# Patient Record
Sex: MALE | Race: WHITE | Employment: OTHER | URBAN - METROPOLITAN AREA
[De-identification: names, ages, dates, MRNs, and addresses within clinical notes are randomized per-mention and may not be internally consistent; named-entity substitution may affect disease eponyms.]

---

## 2017-03-14 ENCOUNTER — TELEPHONE (OUTPATIENT)
Dept: UROLOGY | Age: 77
End: 2017-03-14

## 2022-10-19 ENCOUNTER — HOSPITAL ENCOUNTER (OUTPATIENT)
Age: 82
Setting detail: SPECIMEN
Discharge: HOME | End: 2022-10-19

## 2022-10-19 LAB
ANION GAP SERPL CALCULATED.3IONS-SCNC: 12 MMOL/L (ref 9–17)
BUN BLDV-MCNC: 20 MG/DL (ref 8–23)
CALCIUM SERPL-MCNC: 9.4 MG/DL (ref 8.6–10.4)
CHLORIDE BLD-SCNC: 104 MMOL/L (ref 98–107)
CO2: 24 MMOL/L (ref 20–31)
CREAT SERPL-MCNC: 1.28 MG/DL (ref 0.7–1.2)
GFR SERPL CREATININE-BSD FRML MDRD: 56 ML/MIN/1.73M2
GLUCOSE BLD-MCNC: 288 MG/DL (ref 70–99)
POTASSIUM SERPL-SCNC: 4.8 MMOL/L (ref 3.7–5.3)
SODIUM BLD-SCNC: 140 MMOL/L (ref 135–144)

## 2022-11-16 ENCOUNTER — HOSPITAL ENCOUNTER (OUTPATIENT)
Age: 82
Setting detail: SPECIMEN
Discharge: HOME | End: 2022-11-16

## 2022-11-16 LAB
ANION GAP SERPL CALCULATED.3IONS-SCNC: 13 MMOL/L (ref 9–17)
BUN BLDV-MCNC: 20 MG/DL (ref 8–23)
CALCIUM SERPL-MCNC: 9.4 MG/DL (ref 8.6–10.4)
CHLORIDE BLD-SCNC: 101 MMOL/L (ref 98–107)
CO2: 25 MMOL/L (ref 20–31)
CREAT SERPL-MCNC: 1.31 MG/DL (ref 0.7–1.2)
GFR SERPL CREATININE-BSD FRML MDRD: 55 ML/MIN/1.73M2
GLUCOSE BLD-MCNC: 309 MG/DL (ref 70–99)
POTASSIUM SERPL-SCNC: 4.9 MMOL/L (ref 3.7–5.3)
SODIUM BLD-SCNC: 139 MMOL/L (ref 135–144)

## 2023-03-13 ENCOUNTER — HOSPITAL ENCOUNTER (OUTPATIENT)
Dept: CARDIAC CATH/INVASIVE PROCEDURES | Age: 83
Discharge: HOME | End: 2023-03-13
Payer: COMMERCIAL

## 2023-03-13 VITALS
TEMPERATURE: 97.6 F | SYSTOLIC BLOOD PRESSURE: 131 MMHG | HEART RATE: 60 BPM | RESPIRATION RATE: 19 BRPM | DIASTOLIC BLOOD PRESSURE: 58 MMHG | OXYGEN SATURATION: 99 %

## 2023-03-13 LAB
EGFR, POC: 47 ML/MIN/1.73M2
GLUCOSE BLD-MCNC: 151 MG/DL (ref 74–100)
PLATELET # BLD AUTO: 163 K/UL (ref 138–453)
POC BUN: 27 MG/DL (ref 8–26)
POC CHLORIDE: 106 MMOL/L (ref 98–107)
POC CREATININE: 1.49 MG/DL (ref 0.51–1.19)
POC HEMATOCRIT: 42 % (ref 41–53)
POC HEMOGLOBIN: 14.3 G/DL (ref 13.5–17.5)
POC IONIZED CALCIUM: 1.21 MMOL/L (ref 1.15–1.33)
POC POTASSIUM: 4.1 MMOL/L (ref 3.5–4.5)
POC SODIUM: 144 MMOL/L (ref 138–146)

## 2023-03-13 PROCEDURE — 2580000003 HC RX 258

## 2023-03-13 PROCEDURE — 82947 ASSAY GLUCOSE BLOOD QUANT: CPT

## 2023-03-13 PROCEDURE — 2720000010 HC SURG SUPPLY STERILE

## 2023-03-13 PROCEDURE — 7100000011 HC PHASE II RECOVERY - ADDTL 15 MIN

## 2023-03-13 PROCEDURE — 6360000002 HC RX W HCPCS

## 2023-03-13 PROCEDURE — 82330 ASSAY OF CALCIUM: CPT

## 2023-03-13 PROCEDURE — 85049 AUTOMATED PLATELET COUNT: CPT

## 2023-03-13 PROCEDURE — 84132 ASSAY OF SERUM POTASSIUM: CPT

## 2023-03-13 PROCEDURE — 33228 REMV&REPLC PM GEN DUAL LEAD: CPT

## 2023-03-13 PROCEDURE — 93458 L HRT ARTERY/VENTRICLE ANGIO: CPT

## 2023-03-13 PROCEDURE — 85014 HEMATOCRIT: CPT

## 2023-03-13 PROCEDURE — C1785 PMKR, DUAL, RATE-RESP: HCPCS

## 2023-03-13 PROCEDURE — 99153 MOD SED SAME PHYS/QHP EA: CPT

## 2023-03-13 PROCEDURE — 2580000003 HC RX 258: Performed by: INTERNAL MEDICINE

## 2023-03-13 PROCEDURE — 2500000003 HC RX 250 WO HCPCS

## 2023-03-13 PROCEDURE — 2709999900 HC NON-CHARGEABLE SUPPLY

## 2023-03-13 PROCEDURE — 7100000010 HC PHASE II RECOVERY - FIRST 15 MIN

## 2023-03-13 PROCEDURE — 84520 ASSAY OF UREA NITROGEN: CPT

## 2023-03-13 PROCEDURE — 82435 ASSAY OF BLOOD CHLORIDE: CPT

## 2023-03-13 PROCEDURE — 82565 ASSAY OF CREATININE: CPT

## 2023-03-13 PROCEDURE — 99152 MOD SED SAME PHYS/QHP 5/>YRS: CPT

## 2023-03-13 PROCEDURE — 84295 ASSAY OF SERUM SODIUM: CPT

## 2023-03-13 RX ORDER — SODIUM CHLORIDE 0.9 % (FLUSH) 0.9 %
5-40 SYRINGE (ML) INJECTION PRN
Status: DISCONTINUED | OUTPATIENT
Start: 2023-03-13 | End: 2023-03-14 | Stop reason: HOSPADM

## 2023-03-13 RX ORDER — SODIUM CHLORIDE 9 MG/ML
INJECTION, SOLUTION INTRAVENOUS CONTINUOUS
Status: DISCONTINUED | OUTPATIENT
Start: 2023-03-13 | End: 2023-03-14 | Stop reason: HOSPADM

## 2023-03-13 RX ORDER — SODIUM CHLORIDE 0.9 % (FLUSH) 0.9 %
5-40 SYRINGE (ML) INJECTION EVERY 12 HOURS SCHEDULED
Status: DISCONTINUED | OUTPATIENT
Start: 2023-03-13 | End: 2023-03-14 | Stop reason: HOSPADM

## 2023-03-13 RX ORDER — SODIUM CHLORIDE 9 MG/ML
INJECTION, SOLUTION INTRAVENOUS PRN
Status: DISCONTINUED | OUTPATIENT
Start: 2023-03-13 | End: 2023-03-14 | Stop reason: HOSPADM

## 2023-03-13 RX ADMIN — SODIUM CHLORIDE: 9 INJECTION, SOLUTION INTRAVENOUS at 09:32

## 2023-03-13 NOTE — PROGRESS NOTES
Patient admitted, consent signed and questions answered. Patient ready for procedure. Call light to reach with side rails up 2 of 2. Lt. chest clipped and wiped down with writer and Edis Pal present. No family at bedside with patient. History and physical complete.

## 2023-03-13 NOTE — PROGRESS NOTES
All discharge instructions reviewed, questions answered, paper signed and given copy. Patient discharged per wheelchair with PPM booklet/card, d/c paperwork and belongings.

## 2023-03-13 NOTE — PROCEDURES
[facility]                Procedure Note  Dual Chamber PPM battery change out        Nico Melvin (5 y.o., male)  1940      3/13/2023      Procedure: Battery change out    Operators:  Primary: Rain Raya MD    Indication: Battery EOL    Pre Procedure Conscious Sedation Data:    ASA Class:    [] I [x] II [] III [] IV    Mallampati Class:  [] I [x] II [] III [] IV          / Device Data:             [x] Sedation monitoring  [] Left Subclavian Angiogram   [] RV lead   [] RA lead   [] LV lead   [x] Intra - OP Lead Testing  [] Coronary Sinus Angiogram   [x] Pocket   [x] Generators Implant      Procedure  After the usual preparation of the left neck and chest, the patient was draped in the usual sterile manner. Local anesthesia was infused below the left clavicle from the midline laterally. An incision was made inferior and parallel to the clavicle. The incision was carried down to the fascia. A pocket was formed inferior using blunt dissection. Generator: The implanted leads were attached to the pacemaker using the setscrews. The pocket was irrigated with antibiotic solution. The pulse generator and leads were coiled and placed in the pocket. Fluoroscopy was used to verify the final placement of the pacemaker and leads. The pocket was closed using multiple layers of suture and a dry sterile dressing was applied. There were no complications, patient tolerated the procedure well. The patient left the EP lab in stable condition. Impression / Device:  Successful Implantation of:  Battery change out  Estimated blood loss less than 25 ml    Plan:  Telemetry monitoring  Interrogate pacemaker before discharge  CXR if needed  Wound check at [facility] in 7days  Discharge if patient remains stable    Patient instructed to no showering or get the wound wet for 1 week, no driving for 1 week, no lifting more than 10 pounds for 4 weeks, no arm raising above the shoulder for 4 weeks.  No lovenox or heparin at any dose is to be given    Electronically signed by Zeenat Colorado MD on 3/13/2023 at 11:29 AM

## 2023-03-13 NOTE — H&P
[facility]   History & Physical               Today's Date: 3/13/2023  Patient Name: Delia Flores  Date of admission: 3/13/2023  8:09 AM  Patient's age: 81 y.o., 1940  Admission Dx: Chest pain at rest [R07.9]    Reason for Admission:  Battery change out    CHIEF COMPLAINT:    No chief complaint on file. History Obtained From:  patient, electronic medical record    HISTORY OF PRESENT ILLNESS:    81year old male with history of complete heart block s/p ppm presents for a battery change out. Patient was seen in clinic by Dr. Benji Jackman and underwent a device interrogation which showed him to have < 5 months of battery life. Patient presents today for battery change out. Reports no complaints this AM.    Past Medical History:   has a past medical history of Gout, Hypercholesteremia, Hyperkalemia, Type II or unspecified type diabetes mellitus without mention of complication, uncontrolled, and Unspecified essential hypertension. Past Surgical History:   has a past surgical history that includes Carotid endarterectomy; Vasectomy; Spine surgery (1990); Spine surgery (1999); Colonoscopy (2005); Cataract removal; and Pacemaker insertion (2011). Home Medications:    Prior to Admission medications    Medication Sig Start Date End Date Taking? Authorizing Provider   allopurinol (ZYLOPRIM) 300 MG tablet TAKE 1 TABLET DAILY 10/13/14   Chris Alamo MD   atorvastatin (LIPITOR) 10 MG tablet Take 1 tablet by mouth daily. 8/12/14   Chris Alamo MD   metFORMIN (GLUCOPHAGE) 500 MG tablet Take 1 tablet by mouth 3 times daily (with meals). 5/27/14   Chris Alamo MD   tamsulosin (FLOMAX) 0.4 MG capsule TAKE 1 CAPSULE DAILY 5/27/14   Chris Alamo MD   lisinopril (PRINIVIL;ZESTRIL) 20 MG tablet Take 1 tablet by mouth daily. 5/27/14   Chris Alamo MD   oxyCODONE-acetaminophen (PERCOCET) 5-325 MG per tablet Take 1 tablet by mouth every 4 hours as needed. Historical Provider, MD   aspirin 325 MG tablet Take 325 mg by mouth daily. Jade Teresa MD   traMADol (ULTRAM) 50 MG tablet Take 50 mg by mouth every 6 hours as needed. Jade Teresa MD        No current facility-administered medications for this encounter. Allergies:  Flexeril [cyclobenzaprine hcl]    Social History:   reports that he quit smoking about 30 years ago. His smoking use included cigarettes. He has a 80.00 pack-year smoking history. He has never used smokeless tobacco. He reports that he does not drink alcohol. Family History: family history includes Emphysema in his father; Heart Disease in his father. REVIEW OF SYSTEMS:      Constitutional: there has been no unanticipated weight loss. Eyes: No visual changes or diplopia. ENT: No Headaches  Cardiovascular:  Remaining as above  Respiratory: No cough  Gastrointestinal: No abdominal pain. No change in bowel or bladder habits. Genitourinary: No dysuria, trouble voiding, or hematuria. Neurological: No headache. PHYSICAL EXAM:      There were no vitals taken for this visit. No intake or output data in the 24 hours ending 03/13/23 0813        Constitutional and General Appearance:   alert, cooperative, no distress and appears stated age  [de-identified]:  PERRL, EOMI  Respiratory:  Normal excursion and expansion without use of accessory muscles  Resp Auscultation:  Good respiratory effort. No for increased work of breathing. On auscultation: clear to auscultation bilaterally  Cardiovascular:  Regular rate and rhythm  S1/S2  No murmurs  The apical impulse is not displaced  Abdomen:  Soft  Bowel sounds present  Non-tender to palpation  Extremities:  No cyanosis or clubbing  Lower extremity edema: No  Skin:  Warm and dry  Neurological:  Not performed    DATA:    Diagnostics:    Device Interrogation      Labs:     CBC: No results for input(s): WBC, HGB, HCT, PLT in the last 72 hours.   BMP: No results for input(s): NA, K, CO2, BUN, CREATININE, LABGLOM, GLUCOSE in the last 72 hours. Pro-BNP:  No results for input(s): PROBNP in the last 72 hours. BNP: No results for input(s): BNP in the last 72 hours. PT/INR: No results for input(s): PROTIME, INR in the last 72 hours. APTT:No results for input(s): APTT in the last 72 hours. CARDIAC ENZYMES:No results for input(s): CKTOTAL, CKMB, CKMBINDEX, TROPONINI in the last 72 hours. Invalid input(s):  TROPONINT  No results for input(s): TROPONINT in the last 72 hours. FASTING LIPID PANEL:  Lab Results   Component Value Date/Time    HDL 39 08/04/2014 08:40 AM    TRIG 391 08/04/2014 08:40 AM     LIVER PROFILE:No results for input(s): AST, ALT, LABALBU in the last 72 hours. Patient's Active Problem List  Active Problems:    * No active hospital problems. *  Resolved Problems:    * No resolved hospital problems. *        IMPRESSION:    Complete Heart Block s/p PPM (noted to have <5 months of battery life)  HTN  Heart catheterization 07/2011 showed LMCA normal, LAD 30% lesion, LCx normal and RCA 30% lesion. HLD  VERONICA    RECOMMENDATIONS:  Proceed with battery change out  Further recommendations to follow after procedure. Pre Procedure Conscious Sedation Data:  ASA Class:                  [] I [x] II [] III [] IV     Mallampati Class:       [] I [x] II [] III [] IV    The risks, indications, benefits, as well as alternatives to the procedure  were discussed with the patient and family. The risks include but is not  limited to bleeding, hematoma formation, pneumothorax, infection, cardiac  perforation, and rarely heart attack. The patient understood and wished to  proceed. Discussed with patient and Nurse. Tiara Aguila MD  Fellow, [id number] [facility]      Please note that part of this chart were generated using voice recognition  dictation software.   Although every effort was made to ensure the accuracy of this automated transcription, some errors in transcription may have occurred. Attestation signed by      Attending Physician Statement:    I have discussed the care of  Symone Roach , including pertinent history and exam findings, with the Cardiology fellow/resident. I have seen and examined the patient and the key elements of all parts of the encounter have been performed by me. I agree with the assessment, plan and orders as documented by the fellow/resident, after I modified exam findings and plan of treatments, and the final version is my approved version of the assessment.      Additional Comments:

## 2023-03-13 NOTE — DISCHARGE INSTRUCTIONS
SEDATION / ANALGESIA INFORMATION / Cherelle Tarasgulshanut 86 have received the sedation/analgesia medication during your visit    Sedation/analgesia is used during short medical procedures under controlled supervision. The medication will produce a strong relaxation. You will be able to hear, speak and follow instructions, but your memory and alertness will be decreased. You will be able to swallow and breathe on your own. During sedation/analgesia your blood pressure, heart and breathing will be watched closely. After the procedure, you may not remember what was said or done. You may have the following effects from the medication. \" Drowsiness, dizziness, sleepiness or confusion. \" Difficulty remembering or delayed reaction times. \" Loss of fine muscle control or difficulty with your balance especially while walking. \" Difficulty focusing or blurred vision. You may not be aware of slight changes in your behavior and/or your reaction time because of the medication used during the procedure. Therefore you should follow these instructions. \" Have someone responsible help you with your care. \" Do not drive for 24 hours. \" Do not operate equipment for 24 hours (lawnmowers, power tools, kitchen accessories, stove). \" Do not drink any alcoholic beverages for a minimum of 24 hours. \" Do not make important personal, legal or business decisions for 24 hours. \" You may experience dizziness or lightheadedness. Move slowly and carefully, do not make sudden position changes. \" Drink extra amounts of fluids today. \" Increase your diet as tolerated (unless you have received specific instructions from your doctor). \" If you feel nauseated, continue with liquids until the nausea is gone. \" Notify your physician if you have not urinated within 8 hours after the procedure. \" Resume your medications unless otherwise instructed.     DISCHARGE INSTRUCTIONS PACEMAKER / ICD DR Moriah Donovan    -KEEP THE INCISION CLEAN AND DRY    -the surgical dressing should be removed in the am after surgery and left off.  -the narrow pieces of tape across the incision are called steri-strips.    -they help support the incision while it is healing and allow the steri-strips to fall    off on their own  -if staples used wear loose fitting clothing / and staples need to be removed in 7 to 10 days  -NO SHOWERS FOR 7 DAYS / SPONGE BATHS ONLY        AVOID LIFTING OBJECTS HEAVIER THAN 8 POUNDS OR RAISING THE AFFECTED ARM OVER YOUR HEAD. -this will assist in the healing process. Avoid activities that may result in high    impact or stress at the incisional site. -SLING SHOULD BE WORN AT ALL TIME THE FIRST WEEK   -avoid anything that will rub against the incision  -ice to site as needed  -tylenol and / or prescription if written for pain    NO DRIVING UNTIL SEEN IN OFFICE    CALL FOR WOUND CHECK IN ONE WEEK    CONTACT YOUR DOCTOR IF THERE IS ANY CHANGE IN HOW THE INCISION  SITE IS HEALING    Normally the device may seem to bulge slightly under the skin. The bulge may be more prominent right after the surgery but will become less noticeable over the next 2 weeks. CHANGES TO NOTIFY OUR OFFICE ABOUT;    -increased swelling and /or tenderness  -increased redness of the incision  -drainage from the incision  -a pimple that develops along the incision  -you develop a fever 101 or greater and do not have a cold or the flu    CARRY YOUR ID WITH YOU AT ALL TIMES ALONG WITH A CURRENT LIST OF MEDICATIONS    FURTHER INSTRUCTIONS / RESTRICTIONS WILL BE REVIEWED WITH YOU AT FOLLOW UP APPOINTMENT IN ONE WEEK    -  Learning About ICDs (Implantable Cardioverter-Defibrillators)  What is an ICD (implantable cardioverter-defibrillator)? An ICD (implantable cardioverter-defibrillator) is a small, battery-powered device. It fixes serious changes in your heartbeat.  ICDs are used in people who have had a life-threatening heart rhythm or are at high risk of having one.  The ICD is placed inside the chest. It's attached to one or two wires (called leads) that go into the heart through a vein. How does an ICD work? An ICD is always checking your heart rate and rhythm. If the ICD detects a life-threatening, rapid heart rhythm, it tries to slow the rhythm to get it back to normal. If the bad rhythm doesn't stop, the ICD sends an electric shock to the heart. This restores a normal rhythm. The device then goes back to its watchful mode. An ICD also can fix a heart rate that is too fast or too slow. It does this without using a shock. It can send out electrical pulses to speed up a heart rate that is too slow. Or it can slow down a fast heart rate by matching the pace and bringing the heart rate back to normal.  Your doctor will check the ICD regularly to make sure it is working right and isn't causing any problems. Your doctor will also check the battery to see if it needs to be replaced. How is an ICD placed? Your doctor will put the ICD under the skin in your chest during minor surgery. You will likely have medicine to make you feel relaxed and sleepy during the surgery. Your doctor makes a small cut (incision) in your upper chest. He or she puts one or two leads (wires) in a vein and threads them to the heart. Your doctor connects the leads to the ICD and places it in your chest. Then the incision is closed. Your doctor also programs the ICD. Most people spend the night in the hospital, just to make sure that the device is working and that there are no problems from the surgery. How does it feel to get a shock? The shock from an ICD hurts briefly. People feel it in different ways. It's been described as feeling like a punch in the chest. But the shock is a sign that the ICD is doing its job. It's there to save your life. You won't feel any pain if the ICD uses electrical pulses to fix a heart rate that is too fast or too slow.   There's no way to know how often a shock might occur. It might never happen. Not knowing when or if a shock might happen may make you nervous. Knowing what to do if you get shocked can help. Ask your doctor for an action plan. This plan will guide you step-by-step if a shock happens. What else should you know? You can live a normal life with your ICD. Here are a few tips for living well with your ICD. Avoid strong magnetic and electrical fields. These can keep your device from working right. Most office equipment and home appliances are safe to use. Check with your doctor about which things you should use with caution and which you should stay away from. Be sure that any doctor, dentist, or other health professional you see knows that you have an ICD. Always carry a card that tells what kind of device you have. Wear medical alert jewelry that says you have an ICD. You can buy this at most [employer]. If you do get a shock, follow your action plan for what to do. You can lead an active life with an ICD. Ask your doctor what sort of activity and intensity is safe for you. As you plan for your future and your end of life, be sure to include plans for your ICD. You can make the decision to turn off your ICD as part of the medical treatment you want at the end of life. Follow-up care is a key part of your treatment and safety. Be sure to make and go to all appointments, and call your doctor if you are having problems. It's also a good idea to know your test results and keep a list of the medicines you take. Where can you learn more? Go to https://seema.[employer]. org and sign in to your [employer] account. Enter F777 in the [employer] box to learn more about \"Learning About ICDs (Implantable Cardioverter-Defibrillators). \"     If you do not have an account, please click on the \"Sign Up Now\" link. Current as of: September 29, 2016  Content Version: 11.2  © 4036-7252 [employer].  Care instructions adapted under license by [facility] ([facility]). If you have questions about a medical condition or this instruction, always ask your healthcare professional. [place] [address] any warranty or liability for your use of this information.

## 2023-03-13 NOTE — PROGRESS NOTES
Received post PPM replacement procedure to [facility] room 05. Assessment obtained. Restrictions reviewed with patient. Post procedure pathway initiated. Lt. Chest site soft(no crepitis noted). Dressing dry and intact. No hematoma noted. Patient without complaints.

## 2023-06-01 ENCOUNTER — HOSPITAL ENCOUNTER (INPATIENT)
Age: 83
LOS: 2 days | Discharge: HOME | End: 2023-06-03
Attending: EMERGENCY MEDICINE | Admitting: INTERNAL MEDICINE
Payer: MEDICARE

## 2023-06-01 ENCOUNTER — APPOINTMENT (OUTPATIENT)
Dept: CT IMAGING | Age: 83
End: 2023-06-01
Payer: MEDICARE
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
## 2023-06-01 DIAGNOSIS — S91.119A LACERATION OF TOE OF LEFT FOOT WITHOUT FOREIGN BODY PRESENT OR DAMAGE TO NAIL, UNSPECIFIED TOE, INITIAL ENCOUNTER: ICD-10-CM

## 2023-06-01 DIAGNOSIS — R55 SYNCOPE AND COLLAPSE: ICD-10-CM

## 2023-06-01 DIAGNOSIS — R77.8 ELEVATED TROPONIN: ICD-10-CM

## 2023-06-01 PROBLEM — I21.4 NSTEMI (NON-ST ELEVATED MYOCARDIAL INFARCTION) (HCC): Status: ACTIVE | Noted: 2023-06-01

## 2023-06-01 PROBLEM — I10 PRIMARY HYPERTENSION: Status: ACTIVE | Noted: 2023-06-01

## 2023-06-01 PROBLEM — E66.09 CLASS 1 OBESITY DUE TO EXCESS CALORIES WITH SERIOUS COMORBIDITY AND BODY MASS INDEX (BMI) OF 31.0 TO 31.9 IN ADULT: Status: ACTIVE | Noted: 2023-06-01

## 2023-06-01 PROBLEM — Z95.0 PRESENCE OF PERMANENT CARDIAC PACEMAKER: Status: ACTIVE | Noted: 2023-06-01

## 2023-06-01 PROBLEM — E78.2 MIXED HYPERLIPIDEMIA: Status: ACTIVE | Noted: 2023-06-01

## 2023-06-01 LAB
25(OH)D3 SERPL-MCNC: 36.6 NG/ML
25(OH)D3 SERPL-MCNC: 36.6 NG/ML
ABO + RH BLD: NORMAL
ABO + RH BLD: NORMAL
ALBUMIN SERPL-MCNC: 3.8 G/DL (ref 3.5–5.2)
ALBUMIN SERPL-MCNC: 3.8 G/DL (ref 3.5–5.2)
ANION GAP SERPL CALCULATED.3IONS-SCNC: 15 MMOL/L (ref 9–17)
ANION GAP SERPL CALCULATED.3IONS-SCNC: 15 MMOL/L (ref 9–17)
ARM BAND NUMBER: NORMAL
ARM BAND NUMBER: NORMAL
BILIRUB UR QL STRIP: NEGATIVE
BILIRUB UR QL STRIP: NEGATIVE
BLOOD BANK SPECIMEN: ABNORMAL
BLOOD BANK SPECIMEN: ABNORMAL
BLOOD GROUP ANTIBODIES SERPL: NEGATIVE
BLOOD GROUP ANTIBODIES SERPL: NEGATIVE
BNP SERPL-MCNC: 714 PG/ML
BNP SERPL-MCNC: 714 PG/ML
BUN SERPL-MCNC: 22 MG/DL (ref 8–23)
BUN SERPL-MCNC: 22 MG/DL (ref 8–23)
CARBOXYHEMOGLOBIN: 3.4 % (ref 0–5)
CARBOXYHEMOGLOBIN: 3.4 % (ref 0–5)
CHLORIDE SERPL-SCNC: 102 MMOL/L (ref 98–107)
CHLORIDE SERPL-SCNC: 102 MMOL/L (ref 98–107)
CK SERPL-CCNC: 103 U/L (ref 39–308)
CK SERPL-CCNC: 103 U/L (ref 39–308)
CLARITY UR: CLEAR
CLARITY UR: CLEAR
CO2 SERPL-SCNC: 18 MMOL/L (ref 20–31)
CO2 SERPL-SCNC: 18 MMOL/L (ref 20–31)
COLOR UR: YELLOW
COLOR UR: YELLOW
CREAT SERPL-MCNC: 1.32 MG/DL (ref 0.7–1.2)
CREAT SERPL-MCNC: 1.32 MG/DL (ref 0.7–1.2)
ERYTHROCYTE [DISTWIDTH] IN BLOOD BY AUTOMATED COUNT: 13.6 % (ref 11.8–14.4)
ERYTHROCYTE [DISTWIDTH] IN BLOOD BY AUTOMATED COUNT: 13.6 % (ref 11.8–14.4)
ERYTHROCYTE [DISTWIDTH] IN BLOOD BY AUTOMATED COUNT: ABNORMAL % (ref 11.8–14.4)
ERYTHROCYTE [DISTWIDTH] IN BLOOD BY AUTOMATED COUNT: ABNORMAL % (ref 11.8–14.4)
EST. AVERAGE GLUCOSE BLD GHB EST-MCNC: 203 MG/DL
EST. AVERAGE GLUCOSE BLD GHB EST-MCNC: 203 MG/DL
ETHANOL PERCENT: <0.01 %
ETHANOL PERCENT: <0.01 %
ETHANOLAMINE SERPL-MCNC: <10 MG/DL
ETHANOLAMINE SERPL-MCNC: <10 MG/DL
EXPIRATION DATE: NORMAL
EXPIRATION DATE: NORMAL
FIO2: ABNORMAL
FIO2: ABNORMAL
GFR SERPL CREATININE-BSD FRML MDRD: 54 ML/MIN/1.73M2
GFR SERPL CREATININE-BSD FRML MDRD: 54 ML/MIN/1.73M2
GLUCOSE BLD-MCNC: 174 MG/DL (ref 75–110)
GLUCOSE BLD-MCNC: 174 MG/DL (ref 75–110)
GLUCOSE BLD-MCNC: 193 MG/DL (ref 75–110)
GLUCOSE BLD-MCNC: 193 MG/DL (ref 75–110)
GLUCOSE SERPL-MCNC: 198 MG/DL (ref 70–99)
GLUCOSE SERPL-MCNC: 198 MG/DL (ref 70–99)
GLUCOSE UR STRIP-MCNC: NEGATIVE MG/DL
GLUCOSE UR STRIP-MCNC: NEGATIVE MG/DL
HBA1C MFR BLD: 8.7 % (ref 4–6)
HBA1C MFR BLD: 8.7 % (ref 4–6)
HCG SERPL QL: ABNORMAL
HCG SERPL QL: ABNORMAL
HCO3 VENOUS: 19.7 MMOL/L (ref 24–30)
HCO3 VENOUS: 19.7 MMOL/L (ref 24–30)
HCT VFR BLD AUTO: 38 % (ref 40.7–50.3)
HCT VFR BLD AUTO: 38 % (ref 40.7–50.3)
HCT VFR BLD AUTO: ABNORMAL % (ref 40.7–50.3)
HCT VFR BLD AUTO: ABNORMAL % (ref 40.7–50.3)
HGB BLD-MCNC: 12.4 G/DL (ref 13–17)
HGB BLD-MCNC: 12.4 G/DL (ref 13–17)
HGB BLD-MCNC: ABNORMAL G/DL (ref 13–17)
HGB BLD-MCNC: ABNORMAL G/DL (ref 13–17)
HGB UR QL STRIP.AUTO: ABNORMAL
HGB UR QL STRIP.AUTO: ABNORMAL
INR PPP: 1.2
INR PPP: 1.2
INR PPP: ABNORMAL
INR PPP: ABNORMAL
KETONES UR STRIP-MCNC: NEGATIVE MG/DL
KETONES UR STRIP-MCNC: NEGATIVE MG/DL
LEUKOCYTE ESTERASE UR QL STRIP: NEGATIVE
LEUKOCYTE ESTERASE UR QL STRIP: NEGATIVE
MCH RBC QN AUTO: 29.5 PG (ref 25.2–33.5)
MCH RBC QN AUTO: 29.5 PG (ref 25.2–33.5)
MCH RBC QN AUTO: ABNORMAL PG (ref 25.2–33.5)
MCH RBC QN AUTO: ABNORMAL PG (ref 25.2–33.5)
MCHC RBC AUTO-ENTMCNC: 32.6 G/DL (ref 28.4–34.8)
MCHC RBC AUTO-ENTMCNC: 32.6 G/DL (ref 28.4–34.8)
MCHC RBC AUTO-ENTMCNC: ABNORMAL G/DL (ref 28.4–34.8)
MCHC RBC AUTO-ENTMCNC: ABNORMAL G/DL (ref 28.4–34.8)
MCV RBC AUTO: 90.3 FL (ref 82.6–102.9)
MCV RBC AUTO: 90.3 FL (ref 82.6–102.9)
MCV RBC AUTO: ABNORMAL FL (ref 82.6–102.9)
MCV RBC AUTO: ABNORMAL FL (ref 82.6–102.9)
MYOGLOBIN SERPL-MCNC: 137 NG/ML (ref 28–72)
MYOGLOBIN SERPL-MCNC: 137 NG/ML (ref 28–72)
NEGATIVE BASE EXCESS, VEN: 1.6 MMOL/L (ref 0–2)
NEGATIVE BASE EXCESS, VEN: 1.6 MMOL/L (ref 0–2)
NITRITE UR QL STRIP: NEGATIVE
NITRITE UR QL STRIP: NEGATIVE
NRBC AUTOMATED: 0 PER 100 WBC
NRBC AUTOMATED: 0 PER 100 WBC
NRBC AUTOMATED: ABNORMAL PER 100 WBC
NRBC AUTOMATED: ABNORMAL PER 100 WBC
O2 SAT, VEN: 98.3 % (ref 60–85)
O2 SAT, VEN: 98.3 % (ref 60–85)
PARTIAL THROMBOPLASTIN TIME: 27.2 SEC (ref 23–36.5)
PARTIAL THROMBOPLASTIN TIME: 27.2 SEC (ref 23–36.5)
PARTIAL THROMBOPLASTIN TIME: 84.1 SEC (ref 23–36.5)
PARTIAL THROMBOPLASTIN TIME: 84.1 SEC (ref 23–36.5)
PARTIAL THROMBOPLASTIN TIME: ABNORMAL SEC
PARTIAL THROMBOPLASTIN TIME: ABNORMAL SEC
PATIENT TEMP: 37
PATIENT TEMP: 37
PCO2, VEN: 25.1 MM HG (ref 39–55)
PCO2, VEN: 25.1 MM HG (ref 39–55)
PH UR STRIP: 5 [PH] (ref 5–8)
PH UR STRIP: 5 [PH] (ref 5–8)
PH VENOUS: 7.51 (ref 7.32–7.42)
PH VENOUS: 7.51 (ref 7.32–7.42)
PLATELET # BLD AUTO: ABNORMAL K/UL (ref 138–453)
PLATELET, FLUORESCENCE: 133 K/UL (ref 138–453)
PLATELET, FLUORESCENCE: 133 K/UL (ref 138–453)
PLATELETS.RETICULATED NFR BLD AUTO: 5.5 % (ref 1.1–10.3)
PLATELETS.RETICULATED NFR BLD AUTO: 5.5 % (ref 1.1–10.3)
PMV BLD AUTO: ABNORMAL FL (ref 8.1–13.5)
PMV BLD AUTO: ABNORMAL FL (ref 8.1–13.5)
PO2, VEN: 107 MM HG (ref 30–50)
PO2, VEN: 107 MM HG (ref 30–50)
POTASSIUM SERPL-SCNC: 4.3 MMOL/L (ref 3.7–5.3)
POTASSIUM SERPL-SCNC: 4.3 MMOL/L (ref 3.7–5.3)
PROT UR STRIP-MCNC: ABNORMAL MG/DL
PROT UR STRIP-MCNC: ABNORMAL MG/DL
PROTHROMBIN TIME: 14.9 SEC (ref 11.7–14.9)
PROTHROMBIN TIME: 14.9 SEC (ref 11.7–14.9)
PROTHROMBIN TIME: ABNORMAL SEC
PROTHROMBIN TIME: ABNORMAL SEC
RBC # BLD AUTO: 4.21 M/UL (ref 4.21–5.77)
RBC # BLD AUTO: 4.21 M/UL (ref 4.21–5.77)
RBC # BLD AUTO: ABNORMAL M/UL (ref 4.21–5.77)
RBC # BLD AUTO: ABNORMAL M/UL (ref 4.21–5.77)
REASON FOR REJECTION: NORMAL
REASON FOR REJECTION: NORMAL
SODIUM SERPL-SCNC: 135 MMOL/L (ref 135–144)
SODIUM SERPL-SCNC: 135 MMOL/L (ref 135–144)
SP GR UR STRIP: 1.02 (ref 1–1.03)
SP GR UR STRIP: 1.02 (ref 1–1.03)
SPECIMEN SOURCE: NORMAL
SPECIMEN SOURCE: NORMAL
T4 FREE SERPL-MCNC: 1.7 NG/DL (ref 0.9–1.7)
T4 FREE SERPL-MCNC: 1.7 NG/DL (ref 0.9–1.7)
TROPONIN I SERPL HS-MCNC: 40 NG/L (ref 0–22)
TROPONIN I SERPL HS-MCNC: 40 NG/L (ref 0–22)
TROPONIN I SERPL HS-MCNC: 47 NG/L (ref 0–22)
TROPONIN I SERPL HS-MCNC: 47 NG/L (ref 0–22)
TSH SERPL-MCNC: 0.44 UIU/ML (ref 0.3–5)
TSH SERPL-MCNC: 0.44 UIU/ML (ref 0.3–5)
UROBILINOGEN UR STRIP-ACNC: NORMAL
UROBILINOGEN UR STRIP-ACNC: NORMAL
VIT B12 SERPL-MCNC: 609 PG/ML (ref 232–1245)
VIT B12 SERPL-MCNC: 609 PG/ML (ref 232–1245)
WBC OTHER # BLD: 9.4 K/UL (ref 3.5–11.3)
WBC OTHER # BLD: 9.4 K/UL (ref 3.5–11.3)
WBC OTHER # BLD: ABNORMAL K/UL (ref 3.5–11.3)
WBC OTHER # BLD: ABNORMAL K/UL (ref 3.5–11.3)
ZZ NTE CLEAN UP: ORDERED TEST: NORMAL
ZZ NTE CLEAN UP: ORDERED TEST: NORMAL

## 2023-06-01 PROCEDURE — 85027 COMPLETE CBC AUTOMATED: CPT

## 2023-06-01 PROCEDURE — 70450 CT HEAD/BRAIN W/O DYE: CPT

## 2023-06-01 PROCEDURE — 6360000002 HC RX W HCPCS: Performed by: STUDENT IN AN ORGANIZED HEALTH CARE EDUCATION/TRAINING PROGRAM

## 2023-06-01 PROCEDURE — 3209999900 CT THORACIC SPINE TRAUMA RECONSTRUCTION

## 2023-06-01 PROCEDURE — 82306 VITAMIN D 25 HYDROXY: CPT

## 2023-06-01 PROCEDURE — 86900 BLOOD TYPING SEROLOGIC ABO: CPT

## 2023-06-01 PROCEDURE — 2500000003 HC RX 250 WO HCPCS

## 2023-06-01 PROCEDURE — 99285 EMERGENCY DEPT VISIT HI MDM: CPT

## 2023-06-01 PROCEDURE — 2500000003 HC RX 250 WO HCPCS: Performed by: STUDENT IN AN ORGANIZED HEALTH CARE EDUCATION/TRAINING PROGRAM

## 2023-06-01 PROCEDURE — 81001 URINALYSIS AUTO W/SCOPE: CPT

## 2023-06-01 PROCEDURE — 86901 BLOOD TYPING SEROLOGIC RH(D): CPT

## 2023-06-01 PROCEDURE — 72125 CT NECK SPINE W/O DYE: CPT

## 2023-06-01 PROCEDURE — 83036 HEMOGLOBIN GLYCOSYLATED A1C: CPT

## 2023-06-01 PROCEDURE — 83874 ASSAY OF MYOGLOBIN: CPT

## 2023-06-01 PROCEDURE — 96374 THER/PROPH/DIAG INJ IV PUSH: CPT

## 2023-06-01 PROCEDURE — 83880 ASSAY OF NATRIURETIC PEPTIDE: CPT

## 2023-06-01 PROCEDURE — 90471 IMMUNIZATION ADMIN: CPT | Performed by: STUDENT IN AN ORGANIZED HEALTH CARE EDUCATION/TRAINING PROGRAM

## 2023-06-01 PROCEDURE — 82040 ASSAY OF SERUM ALBUMIN: CPT

## 2023-06-01 PROCEDURE — 84484 ASSAY OF TROPONIN QUANT: CPT

## 2023-06-01 PROCEDURE — 84443 ASSAY THYROID STIM HORMONE: CPT

## 2023-06-01 PROCEDURE — 84520 ASSAY OF UREA NITROGEN: CPT

## 2023-06-01 PROCEDURE — 80051 ELECTROLYTE PANEL: CPT

## 2023-06-01 PROCEDURE — 96375 TX/PRO/DX INJ NEW DRUG ADDON: CPT

## 2023-06-01 PROCEDURE — 2060000000 HC ICU INTERMEDIATE R&B

## 2023-06-01 PROCEDURE — 82947 ASSAY GLUCOSE BLOOD QUANT: CPT

## 2023-06-01 PROCEDURE — 84703 CHORIONIC GONADOTROPIN ASSAY: CPT

## 2023-06-01 PROCEDURE — 6810039001 HC L1 TRAUMA PRIORITY

## 2023-06-01 PROCEDURE — 82607 VITAMIN B-12: CPT

## 2023-06-01 PROCEDURE — 82805 BLOOD GASES W/O2 SATURATION: CPT

## 2023-06-01 PROCEDURE — 90715 TDAP VACCINE 7 YRS/> IM: CPT | Performed by: STUDENT IN AN ORGANIZED HEALTH CARE EDUCATION/TRAINING PROGRAM

## 2023-06-01 PROCEDURE — 82550 ASSAY OF CK (CPK): CPT

## 2023-06-01 PROCEDURE — 84439 ASSAY OF FREE THYROXINE: CPT

## 2023-06-01 PROCEDURE — 2580000003 HC RX 258: Performed by: STUDENT IN AN ORGANIZED HEALTH CARE EDUCATION/TRAINING PROGRAM

## 2023-06-01 PROCEDURE — 87040 BLOOD CULTURE FOR BACTERIA: CPT

## 2023-06-01 PROCEDURE — 71260 CT THORAX DX C+: CPT

## 2023-06-01 PROCEDURE — 6360000004 HC RX CONTRAST MEDICATION

## 2023-06-01 PROCEDURE — 86850 RBC ANTIBODY SCREEN: CPT

## 2023-06-01 PROCEDURE — 82565 ASSAY OF CREATININE: CPT

## 2023-06-01 PROCEDURE — 6370000000 HC RX 637 (ALT 250 FOR IP): Performed by: STUDENT IN AN ORGANIZED HEALTH CARE EDUCATION/TRAINING PROGRAM

## 2023-06-01 PROCEDURE — 85610 PROTHROMBIN TIME: CPT

## 2023-06-01 PROCEDURE — 93005 ELECTROCARDIOGRAM TRACING: CPT | Performed by: EMERGENCY MEDICINE

## 2023-06-01 PROCEDURE — G0480 DRUG TEST DEF 1-7 CLASSES: HCPCS

## 2023-06-01 PROCEDURE — 80307 DRUG TEST PRSMV CHEM ANLYZR: CPT

## 2023-06-01 PROCEDURE — 36415 COLL VENOUS BLD VENIPUNCTURE: CPT

## 2023-06-01 PROCEDURE — 99223 1ST HOSP IP/OBS HIGH 75: CPT | Performed by: SURGERY

## 2023-06-01 PROCEDURE — 85730 THROMBOPLASTIN TIME PARTIAL: CPT

## 2023-06-01 PROCEDURE — 85055 RETICULATED PLATELET ASSAY: CPT

## 2023-06-01 PROCEDURE — 3209999900 CT LUMBAR SPINE TRAUMA RECONSTRUCTION

## 2023-06-01 PROCEDURE — 12001 RPR S/N/AX/GEN/TRNK 2.5CM/<: CPT | Performed by: SURGERY

## 2023-06-01 RX ORDER — POTASSIUM CHLORIDE 20 MEQ/1
40 TABLET, EXTENDED RELEASE ORAL PRN
Status: DISCONTINUED | OUTPATIENT
Start: 2023-06-01 | End: 2023-06-03 | Stop reason: HOSPADM

## 2023-06-01 RX ORDER — MAGNESIUM SULFATE IN WATER 40 MG/ML
2000 INJECTION, SOLUTION INTRAVENOUS PRN
Status: DISCONTINUED | OUTPATIENT
Start: 2023-06-01 | End: 2023-06-03 | Stop reason: HOSPADM

## 2023-06-01 RX ORDER — HEPARIN SODIUM 1000 [USP'U]/ML
4000 INJECTION, SOLUTION INTRAVENOUS; SUBCUTANEOUS PRN
Status: DISCONTINUED | OUTPATIENT
Start: 2023-06-01 | End: 2023-06-03 | Stop reason: HOSPADM

## 2023-06-01 RX ORDER — HEPARIN SODIUM 1000 [USP'U]/ML
2000 INJECTION, SOLUTION INTRAVENOUS; SUBCUTANEOUS PRN
Status: DISCONTINUED | OUTPATIENT
Start: 2023-06-01 | End: 2023-06-03 | Stop reason: HOSPADM

## 2023-06-01 RX ORDER — INSULIN LISPRO 100 [IU]/ML
0-4 INJECTION, SOLUTION INTRAVENOUS; SUBCUTANEOUS NIGHTLY
Status: DISCONTINUED | OUTPATIENT
Start: 2023-06-01 | End: 2023-06-03 | Stop reason: HOSPADM

## 2023-06-01 RX ORDER — HEPARIN SODIUM 10000 [USP'U]/100ML
5-30 INJECTION, SOLUTION INTRAVENOUS CONTINUOUS
Status: DISCONTINUED | OUTPATIENT
Start: 2023-06-01 | End: 2023-06-02

## 2023-06-01 RX ORDER — ONDANSETRON 4 MG/1
4 TABLET, ORALLY DISINTEGRATING ORAL EVERY 8 HOURS PRN
Status: DISCONTINUED | OUTPATIENT
Start: 2023-06-01 | End: 2023-06-03 | Stop reason: HOSPADM

## 2023-06-01 RX ORDER — SODIUM CHLORIDE 9 MG/ML
INJECTION, SOLUTION INTRAVENOUS PRN
Status: DISCONTINUED | OUTPATIENT
Start: 2023-06-01 | End: 2023-06-03 | Stop reason: HOSPADM

## 2023-06-01 RX ORDER — LIDOCAINE 4 G/G
1 PATCH TOPICAL DAILY
Status: DISCONTINUED | OUTPATIENT
Start: 2023-06-01 | End: 2023-06-01

## 2023-06-01 RX ORDER — LIDOCAINE HYDROCHLORIDE 10 MG/ML
20 INJECTION, SOLUTION INFILTRATION; PERINEURAL ONCE
Status: COMPLETED | OUTPATIENT
Start: 2023-06-01 | End: 2023-06-01

## 2023-06-01 RX ORDER — SODIUM CHLORIDE 0.9 % (FLUSH) 0.9 %
5-40 SYRINGE (ML) INJECTION PRN
Status: DISCONTINUED | OUTPATIENT
Start: 2023-06-01 | End: 2023-06-03 | Stop reason: HOSPADM

## 2023-06-01 RX ORDER — SODIUM CHLORIDE 9 MG/ML
INJECTION, SOLUTION INTRAVENOUS CONTINUOUS
Status: DISCONTINUED | OUTPATIENT
Start: 2023-06-01 | End: 2023-06-01

## 2023-06-01 RX ORDER — FENTANYL CITRATE 50 UG/ML
50 INJECTION, SOLUTION INTRAMUSCULAR; INTRAVENOUS ONCE
Status: COMPLETED | OUTPATIENT
Start: 2023-06-01 | End: 2023-06-01

## 2023-06-01 RX ORDER — SODIUM CHLORIDE 9 MG/ML
INJECTION, SOLUTION INTRAVENOUS CONTINUOUS
Status: DISCONTINUED | OUTPATIENT
Start: 2023-06-02 | End: 2023-06-02

## 2023-06-01 RX ORDER — DEXTROSE MONOHYDRATE 100 MG/ML
INJECTION, SOLUTION INTRAVENOUS CONTINUOUS PRN
Status: DISCONTINUED | OUTPATIENT
Start: 2023-06-01 | End: 2023-06-03 | Stop reason: HOSPADM

## 2023-06-01 RX ORDER — ACETAMINOPHEN 650 MG/1
650 SUPPOSITORY RECTAL EVERY 6 HOURS PRN
Status: DISCONTINUED | OUTPATIENT
Start: 2023-06-01 | End: 2023-06-03 | Stop reason: HOSPADM

## 2023-06-01 RX ORDER — ALLOPURINOL 100 MG/1
100 TABLET ORAL DAILY
Status: DISCONTINUED | OUTPATIENT
Start: 2023-06-01 | End: 2023-06-03 | Stop reason: HOSPADM

## 2023-06-01 RX ORDER — BUMETANIDE 0.25 MG/ML
1 INJECTION INTRAMUSCULAR; INTRAVENOUS 2 TIMES DAILY
Status: DISCONTINUED | OUTPATIENT
Start: 2023-06-01 | End: 2023-06-03 | Stop reason: HOSPADM

## 2023-06-01 RX ORDER — POTASSIUM CHLORIDE 7.45 MG/ML
10 INJECTION INTRAVENOUS PRN
Status: DISCONTINUED | OUTPATIENT
Start: 2023-06-01 | End: 2023-06-03 | Stop reason: HOSPADM

## 2023-06-01 RX ORDER — SODIUM CHLORIDE 0.9 % (FLUSH) 0.9 %
5-40 SYRINGE (ML) INJECTION EVERY 12 HOURS SCHEDULED
Status: DISCONTINUED | OUTPATIENT
Start: 2023-06-01 | End: 2023-06-03 | Stop reason: HOSPADM

## 2023-06-01 RX ORDER — INSULIN LISPRO 100 [IU]/ML
0-8 INJECTION, SOLUTION INTRAVENOUS; SUBCUTANEOUS
Status: DISCONTINUED | OUTPATIENT
Start: 2023-06-01 | End: 2023-06-03 | Stop reason: HOSPADM

## 2023-06-01 RX ORDER — ONDANSETRON 2 MG/ML
4 INJECTION INTRAMUSCULAR; INTRAVENOUS EVERY 6 HOURS PRN
Status: DISCONTINUED | OUTPATIENT
Start: 2023-06-01 | End: 2023-06-03 | Stop reason: HOSPADM

## 2023-06-01 RX ORDER — OXYCODONE HYDROCHLORIDE 5 MG/1
5 TABLET ORAL EVERY 4 HOURS PRN
Status: DISCONTINUED | OUTPATIENT
Start: 2023-06-01 | End: 2023-06-03 | Stop reason: HOSPADM

## 2023-06-01 RX ORDER — HEPARIN SODIUM 1000 [USP'U]/ML
4000 INJECTION, SOLUTION INTRAVENOUS; SUBCUTANEOUS ONCE
Status: COMPLETED | OUTPATIENT
Start: 2023-06-01 | End: 2023-06-01

## 2023-06-01 RX ORDER — ACETAMINOPHEN 325 MG/1
650 TABLET ORAL EVERY 6 HOURS PRN
Status: DISCONTINUED | OUTPATIENT
Start: 2023-06-01 | End: 2023-06-03 | Stop reason: HOSPADM

## 2023-06-01 RX ADMIN — ACETAMINOPHEN 650 MG: 325 TABLET ORAL at 23:11

## 2023-06-01 RX ADMIN — TETANUS TOXOID, REDUCED DIPHTHERIA TOXOID AND ACELLULAR PERTUSSIS VACCINE, ADSORBED 0.5 ML: 5; 2.5; 8; 8; 2.5 SUSPENSION INTRAMUSCULAR at 14:45

## 2023-06-01 RX ADMIN — OXYCODONE HYDROCHLORIDE 5 MG: 5 TABLET ORAL at 17:34

## 2023-06-01 RX ADMIN — IOPAMIDOL 130 ML: 755 INJECTION, SOLUTION INTRAVENOUS at 12:24

## 2023-06-01 RX ADMIN — ALLOPURINOL 100 MG: 100 TABLET ORAL at 21:12

## 2023-06-01 RX ADMIN — HEPARIN SODIUM 10 UNITS/KG/HR: 10000 INJECTION, SOLUTION INTRAVENOUS at 16:28

## 2023-06-01 RX ADMIN — SODIUM CHLORIDE: 9 INJECTION, SOLUTION INTRAVENOUS at 17:18

## 2023-06-01 RX ADMIN — Medication 2000 MG: at 13:26

## 2023-06-01 RX ADMIN — SODIUM CHLORIDE, PRESERVATIVE FREE 10 ML: 5 INJECTION INTRAVENOUS at 21:13

## 2023-06-01 RX ADMIN — OXYCODONE HYDROCHLORIDE 5 MG: 5 TABLET ORAL at 23:10

## 2023-06-01 RX ADMIN — HEPARIN SODIUM 4000 UNITS: 1000 INJECTION INTRAVENOUS; SUBCUTANEOUS at 16:28

## 2023-06-01 RX ADMIN — FENTANYL CITRATE 50 MCG: 50 INJECTION, SOLUTION INTRAMUSCULAR; INTRAVENOUS at 12:34

## 2023-06-01 RX ADMIN — BUMETANIDE 1 MG: 0.25 INJECTION, SOLUTION INTRAMUSCULAR; INTRAVENOUS at 17:50

## 2023-06-01 RX ADMIN — LIDOCAINE HYDROCHLORIDE 20 ML: 10 INJECTION, SOLUTION INFILTRATION; PERINEURAL at 12:53

## 2023-06-01 RX ADMIN — FENTANYL CITRATE 50 MCG: 50 INJECTION, SOLUTION INTRAMUSCULAR; INTRAVENOUS at 14:44

## 2023-06-01 ASSESSMENT — PAIN DESCRIPTION - LOCATION
LOCATION: BACK
LOCATION: BACK
LOCATION: HEAD

## 2023-06-01 ASSESSMENT — ENCOUNTER SYMPTOMS
ABDOMINAL DISTENTION: 0
COLOR CHANGE: 0
SINUS PAIN: 1
APNEA: 0
RHINORRHEA: 1
EYES NEGATIVE: 1
EYE REDNESS: 0
FACIAL SWELLING: 0
SORE THROAT: 1
EYE PAIN: 0
ALLERGIC/IMMUNOLOGIC NEGATIVE: 1
BACK PAIN: 1
ABDOMINAL PAIN: 0

## 2023-06-01 ASSESSMENT — PAIN SCALES - GENERAL
PAINLEVEL_OUTOF10: 7
PAINLEVEL_OUTOF10: 8
PAINLEVEL_OUTOF10: 2
PAINLEVEL_OUTOF10: 9
PAINLEVEL_OUTOF10: 9

## 2023-06-01 ASSESSMENT — PAIN DESCRIPTION - PAIN TYPE: TYPE: CHRONIC PAIN

## 2023-06-01 ASSESSMENT — PAIN DESCRIPTION - DESCRIPTORS
DESCRIPTORS: ACHING
DESCRIPTORS: ACHING
DESCRIPTORS: ACHING;DISCOMFORT

## 2023-06-01 ASSESSMENT — HEART SCORE: ECG: 1

## 2023-06-01 ASSESSMENT — PAIN DESCRIPTION - ORIENTATION
ORIENTATION: MID
ORIENTATION: LOWER;MID

## 2023-06-01 NOTE — ED NOTES
Pt rolled using spinal precautions, pt reports T and L spine tenderness       Pooja Friend, EMANUEL  06/01/23 3735

## 2023-06-01 NOTE — ED NOTES
Report called to [facility], nurse denies any further questions       Shawn Borden RN  06/01/23 7384

## 2023-06-01 NOTE — ED NOTES
Pt to ED via [facility], pt is a/o x4  Pt reports a fall approx 1 hour ago from standing. Pt denies any LOC, pt denies blood thinners on arrival.  EMS reports unequal pupils, pt did have nausea for EMS and EMS gave 4mg zofran. Pt reports chronic back pain. Pt reports taking 4 percocet daily for pain control for his back. ED and trauma teams at bedside.       Shawn Borden RN  06/01/23 4533

## 2023-06-01 NOTE — ACP (ADVANCE CARE PLANNING)
Advance Care Planning     Advance Care Planning Activator (Inpatient)  Conversation Note      Date of ACP Conversation: 6/1/2023     Conversation Conducted with: Patient with Decision Making Capacity    ACP Activator: Bob Coleman RN    Pt relates he has a living will  Is his own decision maker  Will need DNR paperwork    Health Care Decision Maker:     Current Designated Health Care Decision Maker:     Click here to [address] including section of the Healthcare Decision Maker Relationship (ie \"Primary\")  Today we documented Decision Maker(s) consistent with Legal Next of Kin hierarchy. Care Preferences    Ventilation: \"If you were in your present state of health and suddenly became very ill and were unable to breathe on your own, what would your preference be about the use of a ventilator (breathing machine) if it were available to you? \"      Would the patient desire the use of ventilator (breathing machine)?: no    \"If your health worsens and it becomes clear that your chance of recovery is unlikely, what would your preference be about the use of a ventilator (breathing machine) if it were available to you? \"     Would the patient desire the use of ventilator (breathing machine)?: No      Resuscitation  \"CPR works best to restart the heart when there is a sudden event, like a heart attack, in someone who is otherwise healthy. Unfortunately, CPR does not typically restart the heart for people who have serious health conditions or who are very sick. \"    \"In the event your heart stopped as a result of an underlying serious health condition, would you want attempts to be made to restart your heart (answer \"yes\" for attempt to resuscitate) or would you prefer a natural death (answer \"no\" for do not attempt to resuscitate)? \" no       [x] Yes   [] No   Educated Patient / [facility] regarding differences between Advance Directives and portable DNR orders.     Length of ACP

## 2023-06-01 NOTE — CARE COORDINATION
Case Management Assessment  Initial Evaluation    Date/Time of Evaluation: 6/1/2023 5:21 PM  Assessment Completed by: Cherrie Chase RN    If patient is discharged prior to next notation, then this note serves as note for discharge by case management. Patient Name: Caitlyn Osborne                   YOB: 1940  Diagnosis: Syncope and collapse [R55]  Elevated troponin [R77.8]  NSTEMI (non-ST elevated myocardial infarction) [facility]) [I21.4]  Fall, initial encounter [W19. XXXA]  Laceration of toe of left foot without foreign body present or damage to nail, unspecified toe, initial encounter [S91.119A]                   Date / Time: 6/1/2023 11:51 AM    Patient Admission Status: Inpatient   Readmission Risk (Low < 19, Mod (19-27), High > 27): Readmission Risk Score: 7.3    Current PCP: No primary care provider on file. PCP verified by CM? (P) Yes    Chart Reviewed: Yes      History Provided by: (P) Patient  Patient Orientation: (P) Alert and Oriented    Patient Cognition: (P) Alert    Hospitalization in the last 30 days (Readmission):  No    If yes, Readmission Assessment in CM Navigator will be completed.     Advance Directives:      Code Status: Full Code   Patient's Primary Decision Maker is:        Discharge Planning:    Patient lives with: (P) Spouse/Significant Other Type of Home: (P) House  Primary Care Giver: (P) Self  Patient Support Systems include: (P) Spouse/Significant Other   Current Financial resources:    Current community resources:    Current services prior to admission: (P) None            Current DME:              Type of Home Care services:       ADLS  Prior functional level: (P) Independent in ADLs/IADLs  Current functional level: (P) Independent in ADLs/IADLs    PT AM-PAC:   /24  OT AM-PAC:   /24    Family can provide assistance at DC: (P) Yes  Would you like Case Management to discuss the discharge plan with any other family members/significant others, and if so, who? (P)

## 2023-06-01 NOTE — H&P
[facility]  Office: [address], DO, Hebert Granger, DO, Pamela Hurst, DO, Prudence Gilbert, DO, Rolando Coe MD, Rossana Dunn MD, Glenda Crigler, MD, Becka Min MD,  Sandi Pinto MD, Choco Bean MD, Carine Lee, DO, Niecy Guerra MD,  Elisa Golden MD, Terese Ku MD, Brandee Ortiz DO, Reece Cunha MD, Jennifer Turner MD, Julissa Rivas DO, Pebbles Brown MD, Gina Howard MD, Sarah Adames MD, Bernie Vanessa MD,  Aurora Heredia DO, Kj Jolly MD,  Gissell Christianson, CNP,  Arianna Borden, CNP, Franc Simons, CNP, Franklyn Leon, CNP,  Dank Langley, [facility], Noemy Brand, CNP, Alva Tripp, CNP, Rui Reeder, CNP, Lam Norris, CNP, Coleman Pena, CNP, Rodriguez John PA-C, Taya Dumont, CNS, Devante Jorgensen, CNP, Tonja Islas, [facility]         [address] [facility]    HISTORY AND PHYSICAL EXAMINATION            Date:   6/1/2023  Patient name:  Keren Cortez  Date of admission:  6/1/2023 11:51 AM  MRN:   [id number]  Account:  [de-identified]  YOB: 1940  PCP:    No primary care provider on file. Room:   [address] [place] [address]  Code Status:    Full Code    Chief Complaint:     Chief Complaint   Patient presents with    Fall       History Obtained From:     patient, electronic medical record    History of Present Illness:     Keren Cortez is a 81 y.o. Non- / non  male who presents with Fall   and is admitted to the hospital for the management of NSTEMI (non-ST elevated myocardial infarction) ([facility] [address] 56-year-old male past medical history of diabetes type 2, hypertension, hyperlipidemia, history of obesity, complete heart block with pacemaker most recently replaced in March 2023 presents with fatigue weakness cough and shortness of breath that have been persisting for the past 3 days.   Patient stated that he was unable to get out of his lazy boy which she normally reclines

## 2023-06-01 NOTE — ED NOTES
Trauma Labs obtained by Preeti Montes De Oca and given to leb tech  at this time.       Labs obtained include:       [x] Trauma Profile    [] Type and Cross     [x] Type and Screen    []ABG      []VBG    [] Cardiac Enzymes    []PFA    []Urinalysis     []SASHA    []TEG    []Standard Teg    []Rapid Teg    []Platelet Mapping    []Rapid COVID          Blair Gonzales RN  06/01/23 4482 none

## 2023-06-01 NOTE — ED PROVIDER NOTES
[facility]  Emergency Department  Faculty Attestation     I performed a history and physical examination of the patient and discussed management with the resident. I reviewed the residents note and agree with the documented findings and plan of care. Any areas of disagreement are noted on the chart. I was personally present for the key portions of any procedures. I have documented in the chart those procedures where I was not present during the key portions. I have reviewed the emergency nurses triage note. I agree with the chief complaint, past medical history, past surgical history, allergies, medications, social and family history as documented unless otherwise noted below. For Physician Assistant/ Nurse Practitioner cases/documentation I have personally evaluated this patient and have completed at least one if not all key elements of the E/M (history, physical exam, and MDM). Additional findings are as noted. Preliminary note started at 12:23 PM EDT    Primary Care Physician:  No primary care provider on file.     Screenings:  [unfilled]    CHIEF COMPLAINT       Chief Complaint   Patient presents with    Fall       RECENT VITALS:   BP (!) 126/49   Pulse 78   Temp 99.3 °F (37.4 °C) (Oral)   Resp 20   SpO2 93%     LABS:  Labs Reviewed   TRAUMA PANEL - Abnormal; Notable for the following components:       Result Value    pH, Godwin 7.506 (*)     pCO2, Godwin 25.1 (*)     pO2, Godwin 107.0 (*)     HCO3, Venous 19.7 (*)     O2 Sat, Godwin 98.3 (*)     All other components within normal limits   VITAMIN B12   SPECIMEN REJECTION   TRAUMA PANEL   ALBUMIN   CK   TROP/MYOGLOBIN   T4, FREE   TSH   VITAMIN D 25 HYDROXY   URINE DRUG SCREEN   URINALYSIS   BRAIN NATRIURETIC PEPTIDE   TROPONIN   TROPONIN   CBC   HEMOGLOBIN A1C   PROTIME-INR   APTT   PREVIOUS SPECIMEN   TYPE AND SCREEN       Radiology  CT HEAD WO CONTRAST    (Results Pending)   CT CERVICAL SPINE WO CONTRAST    (Results
is noted along the maxillary sinus walls. There are bilateral mastoid effusions. SOFT TISSUES/SKULL:  The calvarium is intact. No appreciable scalp soft tissue swelling. 1. Limited study secondary to the scan angle. 2. No gross intracranial abnormality. CT CERVICAL SPINE WO CONTRAST    Result Date: 6/1/2023  EXAMINATION: CT OF THE CERVICAL SPINE WITHOUT CONTRAST 6/1/2023 9:20 am TECHNIQUE: CT of the cervical spine was performed without the administration of intravenous contrast. Multiplanar reformatted images are provided for review. Automated exposure control, iterative reconstruction, and/or weight based adjustment of the mA/kV was utilized to reduce the radiation dose to as low as reasonably achievable. COMPARISON: None. HISTORY: ORDERING SYSTEM PROVIDED HISTORY: trauma TECHNOLOGIST PROVIDED HISTORY: trauma CT CERVICAL SPINE FINDINGS: The cervical spine demonstrates decreased mineralization with normal cervical lordosis. There is no evidence of fracture or subluxation. There is loss of disc height with eburnation of the vertebral endplates throughout the cervical spine. There are anterior and posterior marginal osteophytes at multiple levels. The central canal is grossly patent. . There is bilateral facet hypertrophy at multiple levels throughout the cervical spine. Bilateral laminectomies from C 3 through C6 with decompression of the spinal canal.  The pedicles and posterior elements are otherwise intact. The prevertebral and paravertebral soft tissues are unremarkable. The atlanto-dens interval and dens are intact. The visualized lung apices are clear. Vascular calcifications are seen compatible with atherosclerotic disease. Thyroid gland appears heterogenous with punctate calcifications. Multilevel cervical spondylosis and degenerative disc disease. Postsurgical changes No acute bony abnormalities are noted in the cervical spine Thyroid gland appears heterogenous with punctate calcifications.
WORK    CRITICAL CARE:  There was significant risk of life threatening deterioration of patient's condition requiring my direct management. Critical care time 20 minutes, excluding any documented procedures. FINAL IMPRESSION      1. Fall, initial encounter    2. Syncope and collapse    3. Elevated troponin    4. Laceration of toe of left foot without foreign body present or damage to nail, unspecified toe, initial encounter          DISPOSITION / [address] Admitted 06/01/2023 03:52:37 PM      PATIENT REFERRED TO:  No follow-up provider specified.     DISCHARGE MEDICATIONS:  New Prescriptions    No medications on file       Adeel Christianson DO  Emergency Medicine Resident    (Please note that portions of thisnote were completed with a voice recognition program.  Efforts were made to edit the dictations but occasionally words are mis-transcribed.)       Anoop Connor DO  Resident  06/01/23 3018

## 2023-06-01 NOTE — PROCEDURES
PROCEDURE NOTE - LACERATION CLOSURE    PATIENT NAME: Trauma Aiyana  MEDICAL RECORD NO. [id number]  DATE: 6/1/2023  ATTENDING PHYSICIAN: Dr. Nicholas Grant DIAGNOSIS: Laceration(s) as follows:  -Location: L 5th toe plantar side  -Length: 1.5 cm  -Layered closure: No    POSTOPERATIVE DIAGNOSIS:  Same  PROCEDURE PERFORMED:  Suture closure of laceration  PERFORMING PHYSICIAN: Ivelisse Elmore DO  ANESTHESIA:  Local utilizing  Lidocaine 1% without epinephrine  ESTIMATED BLOOD LOSS:  Less than 25 ml. DISCUSSION:  Trauma Xxgardencity is a 81y.o.-year-old male. Patient requires laceration repair. The history and physical examination were reviewed and confirmed. CONSENT: The patient provided verbal consent for this procedure. PROCEDURE:  Prior to starting, the procedure and patient were confirmed by those present. The wound area was irrigated with sterile saline, cleansed with povidone iodine and draped in a sterile fashion. The wound area was anesthetized with Lidocaine 1% without epinephrine. The wound was explored with the following results No foreign bodies found, No tendon laceration seen. The wound was repaired with 4-0 Prolene using interrupted sutures. The wound was dressed with a bandage. All sponge, instrument and needle counts were correct at the completion of the procedure. The patient tolerated the procedure well. SUTURE COUNT:  Suture count: 2    COMPLICATIONS:  None    Recommend that the patient have IV antibiotics as the plantar tendon was visualized and the patient is a diabetic.      Ivelisse Elmore DO  1:15 PM, 6/1/23

## 2023-06-01 NOTE — ED NOTES
Writer unable to document on physical diagram. Right hip tender, R foot swelling, bilateral knee bruising      Edwardo Fields RN  06/01/23 4898

## 2023-06-01 NOTE — H&P
TRAUMA HISTORY AND PHYSICAL EXAMINATION    PATIENT NAME: Trauma Xxlynettecity  YOB: 1879  MEDICAL RECORD NO. [id number]   DATE: 6/1/2023  PRIMARY CARE PHYSICIAN: No primary care provider on file. PATIENT EVALUATED AT THE REQUEST OF : Griselda    ACTIVATION   []Trauma Alert     [x] Trauma Priority     []Trauma Consult. IMPRESSION:     Male patient who had a fall from standing height. No LOC, no anticoagulation. Laceration left 5th toe. MEDICAL DECISION MAKING AND PLAN:       Pan scan  Will evaluate depth of laceration to the fifth toe  We will get x-rays of bilateral lower extremities were tenderness present. Follow-up scan results        CONSULT SERVICES    [] Neurosurgery     [] Orthopedic Surgery    [] Cardiothoracic     [] Facial Trauma    [] Plastic Surgery (Burn)    [] Pediatric Surgery     [] Internal Medicine    [] Pulmonary Medicine    [] Other:        HISTORY:     Chief Complaint:  \"Fall from standing height\"    INJURY SUMMARY  Bruise present bilateral kneecaps  Laceration on the plantar aspect of fifth toe on left leg. If intracranial hemorrhage is present, is it a:  [] BIG 1  [] BIG 2  [] BIG 3    GENERAL DATA  Age 81 y.o.  male   Patient information was obtained from patient. History/Exam limitations: none.   Patient presented to the Emergency Department by ambulance where the patient received oxygen, cervical collar, and back boarded prior to arrival.  Injury Date: 6/1/2023   Approximate Injury Time: Prior to arrival       Transport mode:   [x]Ambulance      [] Helicopter     []Car       [] Other  Referring Hospital: None    INJURY LOCATION, (e.g., home, farm, industry, street)  Specific Details of Location (e.g., bedroom, kitchen, garage): Home  Type of Residence (if occurred in home setting) (e.g., apartment, mobile home, single family home): [facility]    [] Motor Vehicle Collision   Specific vehicle type involved (e.g., sedan, minivan, SUV, pickup

## 2023-06-02 ENCOUNTER — APPOINTMENT (OUTPATIENT)
Dept: ULTRASOUND IMAGING | Age: 83
End: 2023-06-02
Payer: MEDICARE

## 2023-06-02 PROBLEM — N18.31 STAGE 3A CHRONIC KIDNEY DISEASE (HCC): Status: ACTIVE | Noted: 2023-06-02

## 2023-06-02 PROBLEM — N17.9 AKI (ACUTE KIDNEY INJURY) (HCC): Status: ACTIVE | Noted: 2023-06-02
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
## 2023-06-02 LAB
ANION GAP SERPL CALCULATED.3IONS-SCNC: 15 MMOL/L (ref 9–17)
ANION GAP SERPL CALCULATED.3IONS-SCNC: 15 MMOL/L (ref 9–17)
BASOPHILS # BLD: 0 K/UL (ref 0–0.2)
BASOPHILS # BLD: 0 K/UL (ref 0–0.2)
BASOPHILS NFR BLD: 0 % (ref 0–2)
BASOPHILS NFR BLD: 0 % (ref 0–2)
BUN SERPL-MCNC: 26 MG/DL (ref 8–23)
BUN SERPL-MCNC: 26 MG/DL (ref 8–23)
CALCIUM SERPL-MCNC: 8.7 MG/DL (ref 8.6–10.4)
CALCIUM SERPL-MCNC: 8.7 MG/DL (ref 8.6–10.4)
CHLORIDE SERPL-SCNC: 102 MMOL/L (ref 98–107)
CHLORIDE SERPL-SCNC: 102 MMOL/L (ref 98–107)
CO2 SERPL-SCNC: 21 MMOL/L (ref 20–31)
CO2 SERPL-SCNC: 21 MMOL/L (ref 20–31)
CREAT SERPL-MCNC: 1.5 MG/DL (ref 0.7–1.2)
CREAT SERPL-MCNC: 1.5 MG/DL (ref 0.7–1.2)
EKG ATRIAL RATE: 88 BPM
EKG ATRIAL RATE: 88 BPM
EKG P AXIS: 61 DEGREES
EKG P AXIS: 61 DEGREES
EKG P-R INTERVAL: 202 MS
EKG P-R INTERVAL: 202 MS
EKG Q-T INTERVAL: 442 MS
EKG Q-T INTERVAL: 442 MS
EKG QRS DURATION: 148 MS
EKG QRS DURATION: 148 MS
EKG QTC CALCULATION (BAZETT): 534 MS
EKG QTC CALCULATION (BAZETT): 534 MS
EKG R AXIS: -71 DEGREES
EKG R AXIS: -71 DEGREES
EKG T AXIS: 97 DEGREES
EKG T AXIS: 97 DEGREES
EKG VENTRICULAR RATE: 88 BPM
EKG VENTRICULAR RATE: 88 BPM
EOSINOPHIL # BLD: 0.09 K/UL (ref 0–0.4)
EOSINOPHIL # BLD: 0.09 K/UL (ref 0–0.4)
EOSINOPHILS RELATIVE PERCENT: 1 % (ref 1–4)
EOSINOPHILS RELATIVE PERCENT: 1 % (ref 1–4)
EPI CELLS #/AREA URNS HPF: NORMAL /HPF (ref 0–5)
EPI CELLS #/AREA URNS HPF: NORMAL /HPF (ref 0–5)
ERYTHROCYTE [DISTWIDTH] IN BLOOD BY AUTOMATED COUNT: 13.6 % (ref 11.8–14.4)
ERYTHROCYTE [DISTWIDTH] IN BLOOD BY AUTOMATED COUNT: 13.6 % (ref 11.8–14.4)
GFR SERPL CREATININE-BSD FRML MDRD: 46 ML/MIN/1.73M2
GFR SERPL CREATININE-BSD FRML MDRD: 46 ML/MIN/1.73M2
GLUCOSE BLD-MCNC: 186 MG/DL (ref 75–110)
GLUCOSE BLD-MCNC: 186 MG/DL (ref 75–110)
GLUCOSE BLD-MCNC: 194 MG/DL (ref 75–110)
GLUCOSE BLD-MCNC: 194 MG/DL (ref 75–110)
GLUCOSE BLD-MCNC: 225 MG/DL (ref 75–110)
GLUCOSE SERPL-MCNC: 172 MG/DL (ref 70–99)
GLUCOSE SERPL-MCNC: 172 MG/DL (ref 70–99)
HCT VFR BLD AUTO: 41.4 % (ref 40.7–50.3)
HCT VFR BLD AUTO: 41.4 % (ref 40.7–50.3)
HGB BLD-MCNC: 13 G/DL (ref 13–17)
HGB BLD-MCNC: 13 G/DL (ref 13–17)
IMM GRANULOCYTES # BLD AUTO: 0 K/UL (ref 0–0.3)
IMM GRANULOCYTES # BLD AUTO: 0 K/UL (ref 0–0.3)
IMM GRANULOCYTES NFR BLD: 0 %
IMM GRANULOCYTES NFR BLD: 0 %
LYMPHOCYTES # BLD: 16 % (ref 24–44)
LYMPHOCYTES # BLD: 16 % (ref 24–44)
LYMPHOCYTES NFR BLD: 1.38 K/UL (ref 1–4.8)
LYMPHOCYTES NFR BLD: 1.38 K/UL (ref 1–4.8)
MAGNESIUM SERPL-MCNC: 2 MG/DL (ref 1.6–2.6)
MAGNESIUM SERPL-MCNC: 2 MG/DL (ref 1.6–2.6)
MCH RBC QN AUTO: 28.8 PG (ref 25.2–33.5)
MCH RBC QN AUTO: 28.8 PG (ref 25.2–33.5)
MCHC RBC AUTO-ENTMCNC: 31.4 G/DL (ref 28.4–34.8)
MCHC RBC AUTO-ENTMCNC: 31.4 G/DL (ref 28.4–34.8)
MCV RBC AUTO: 91.6 FL (ref 82.6–102.9)
MCV RBC AUTO: 91.6 FL (ref 82.6–102.9)
MONOCYTES NFR BLD: 1.12 K/UL (ref 0.1–0.8)
MONOCYTES NFR BLD: 1.12 K/UL (ref 0.1–0.8)
MONOCYTES NFR BLD: 13 % (ref 1–7)
MONOCYTES NFR BLD: 13 % (ref 1–7)
MORPHOLOGY: NORMAL
MORPHOLOGY: NORMAL
NEUTROPHILS NFR BLD: 70 % (ref 36–66)
NEUTROPHILS NFR BLD: 70 % (ref 36–66)
NEUTS SEG NFR BLD: 6.01 K/UL (ref 1.8–7.7)
NEUTS SEG NFR BLD: 6.01 K/UL (ref 1.8–7.7)
NRBC AUTOMATED: 0 PER 100 WBC
NRBC AUTOMATED: 0 PER 100 WBC
PARTIAL THROMBOPLASTIN TIME: 47.4 SEC (ref 23–36.5)
PARTIAL THROMBOPLASTIN TIME: 47.4 SEC (ref 23–36.5)
PARTIAL THROMBOPLASTIN TIME: 71.1 SEC (ref 23–36.5)
PARTIAL THROMBOPLASTIN TIME: 71.1 SEC (ref 23–36.5)
PLATELET # BLD AUTO: ABNORMAL K/UL (ref 138–453)
PLATELET # BLD AUTO: ABNORMAL K/UL (ref 138–453)
PLATELET, FLUORESCENCE: 136 K/UL (ref 138–453)
PLATELET, FLUORESCENCE: 136 K/UL (ref 138–453)
PLATELETS.RETICULATED NFR BLD AUTO: 5.5 % (ref 1.1–10.3)
PLATELETS.RETICULATED NFR BLD AUTO: 5.5 % (ref 1.1–10.3)
POTASSIUM SERPL-SCNC: 4.1 MMOL/L (ref 3.7–5.3)
POTASSIUM SERPL-SCNC: 4.1 MMOL/L (ref 3.7–5.3)
RBC # BLD AUTO: 4.52 M/UL (ref 4.21–5.77)
RBC # BLD AUTO: 4.52 M/UL (ref 4.21–5.77)
RBC #/AREA URNS HPF: NORMAL /HPF (ref 0–4)
RBC #/AREA URNS HPF: NORMAL /HPF (ref 0–4)
SODIUM SERPL-SCNC: 138 MMOL/L (ref 135–144)
SODIUM SERPL-SCNC: 138 MMOL/L (ref 135–144)
TROPONIN I SERPL HS-MCNC: 54 NG/L (ref 0–22)
TROPONIN I SERPL HS-MCNC: 54 NG/L (ref 0–22)
WBC #/AREA URNS HPF: NORMAL /HPF (ref 0–5)
WBC #/AREA URNS HPF: NORMAL /HPF (ref 0–5)
WBC OTHER # BLD: 8.6 K/UL (ref 3.5–11.3)
WBC OTHER # BLD: 8.6 K/UL (ref 3.5–11.3)

## 2023-06-02 PROCEDURE — 2580000003 HC RX 258: Performed by: STUDENT IN AN ORGANIZED HEALTH CARE EDUCATION/TRAINING PROGRAM

## 2023-06-02 PROCEDURE — 85730 THROMBOPLASTIN TIME PARTIAL: CPT

## 2023-06-02 PROCEDURE — 85055 RETICULATED PLATELET ASSAY: CPT

## 2023-06-02 PROCEDURE — 6370000000 HC RX 637 (ALT 250 FOR IP): Performed by: STUDENT IN AN ORGANIZED HEALTH CARE EDUCATION/TRAINING PROGRAM

## 2023-06-02 PROCEDURE — 80048 BASIC METABOLIC PNL TOTAL CA: CPT

## 2023-06-02 PROCEDURE — 83735 ASSAY OF MAGNESIUM: CPT

## 2023-06-02 PROCEDURE — 76536 US EXAM OF HEAD AND NECK: CPT

## 2023-06-02 PROCEDURE — 97535 SELF CARE MNGMENT TRAINING: CPT

## 2023-06-02 PROCEDURE — 97166 OT EVAL MOD COMPLEX 45 MIN: CPT

## 2023-06-02 PROCEDURE — 2500000003 HC RX 250 WO HCPCS: Performed by: STUDENT IN AN ORGANIZED HEALTH CARE EDUCATION/TRAINING PROGRAM

## 2023-06-02 PROCEDURE — 2580000003 HC RX 258: Performed by: INTERNAL MEDICINE

## 2023-06-02 PROCEDURE — 36415 COLL VENOUS BLD VENIPUNCTURE: CPT

## 2023-06-02 PROCEDURE — 82570 ASSAY OF URINE CREATININE: CPT

## 2023-06-02 PROCEDURE — 85027 COMPLETE CBC AUTOMATED: CPT

## 2023-06-02 PROCEDURE — 84156 ASSAY OF PROTEIN URINE: CPT

## 2023-06-02 PROCEDURE — 76770 US EXAM ABDO BACK WALL COMP: CPT

## 2023-06-02 PROCEDURE — 2060000000 HC ICU INTERMEDIATE R&B

## 2023-06-02 PROCEDURE — 99232 SBSQ HOSP IP/OBS MODERATE 35: CPT | Performed by: INTERNAL MEDICINE

## 2023-06-02 PROCEDURE — 82947 ASSAY GLUCOSE BLOOD QUANT: CPT

## 2023-06-02 PROCEDURE — 99222 1ST HOSP IP/OBS MODERATE 55: CPT | Performed by: INTERNAL MEDICINE

## 2023-06-02 RX ORDER — SODIUM CHLORIDE 9 MG/ML
INJECTION, SOLUTION INTRAVENOUS CONTINUOUS
Status: DISCONTINUED | OUTPATIENT
Start: 2023-06-02 | End: 2023-06-02

## 2023-06-02 RX ORDER — ASPIRIN 81 MG/1
81 TABLET, CHEWABLE ORAL DAILY
Status: DISCONTINUED | OUTPATIENT
Start: 2023-06-02 | End: 2023-06-03 | Stop reason: HOSPADM

## 2023-06-02 RX ORDER — ATORVASTATIN CALCIUM 40 MG/1
40 TABLET, FILM COATED ORAL NIGHTLY
Status: DISCONTINUED | OUTPATIENT
Start: 2023-06-02 | End: 2023-06-03 | Stop reason: HOSPADM

## 2023-06-02 RX ADMIN — ASPIRIN 81 MG: 81 TABLET, CHEWABLE ORAL at 07:41

## 2023-06-02 RX ADMIN — INSULIN LISPRO 2 UNITS: 100 INJECTION, SOLUTION INTRAVENOUS; SUBCUTANEOUS at 17:39

## 2023-06-02 RX ADMIN — SODIUM CHLORIDE, PRESERVATIVE FREE 10 ML: 5 INJECTION INTRAVENOUS at 07:42

## 2023-06-02 RX ADMIN — SODIUM CHLORIDE: 9 INJECTION, SOLUTION INTRAVENOUS at 09:42

## 2023-06-02 RX ADMIN — BUMETANIDE 1 MG: 0.25 INJECTION, SOLUTION INTRAMUSCULAR; INTRAVENOUS at 07:41

## 2023-06-02 RX ADMIN — ALLOPURINOL 100 MG: 100 TABLET ORAL at 07:41

## 2023-06-02 RX ADMIN — OXYCODONE HYDROCHLORIDE 5 MG: 5 TABLET ORAL at 16:00

## 2023-06-02 RX ADMIN — OXYCODONE HYDROCHLORIDE 5 MG: 5 TABLET ORAL at 07:49

## 2023-06-02 RX ADMIN — SODIUM CHLORIDE, PRESERVATIVE FREE 10 ML: 5 INJECTION INTRAVENOUS at 20:55

## 2023-06-02 RX ADMIN — DESMOPRESSIN ACETATE 40 MG: 0.2 TABLET ORAL at 20:55

## 2023-06-02 ASSESSMENT — PAIN DESCRIPTION - LOCATION
LOCATION: BACK;NECK
LOCATION: BACK

## 2023-06-02 ASSESSMENT — PAIN SCALES - GENERAL
PAINLEVEL_OUTOF10: 9
PAINLEVEL_OUTOF10: 8

## 2023-06-02 NOTE — CONSULTS
perform testing: Specimen clotted. 1.2     APTT:  Recent Labs     06/01/23  1304 06/01/23  2214   APTT 27.2 84.1*     CARDIAC ENZYMES:  Recent Labs     06/01/23  1207   CKTOTAL 103     FASTING LIPID PANEL:No results found for: HDL, LDLDIRECT, LDLCALC, TRIG  LIVER PROFILE:  Recent Labs     06/01/23  1207   LABALBU 3.8       IMPRESSION:    NSTEMI likely type II but type I cannot be rule out due to underlying CAD. Mildly uptrending troponin  Complete Heart Block s/p PPM   HTN  Heart catheterization 07/2011 showed LMCA normal, LAD 30% lesion, LCx normal and RCA 30% lesion. HLD  VERONICA  DM  CKD  Currently DNR CCA    RECOMMENDATIONS:  Continue IV heparin  Start aspirin 81 mg, Lipitor 40 mg  Discuss with attending      Final recommendation after discussion with rounding attending       Mer Azul MD. PGY- 4  Cardiology Fellow  [place], [place]          Attending Physician Statement:    I have discussed the care of  Ang Simons , including pertinent history and exam findings, with the Cardiology fellow/resident. I have seen and examined the patient and the key elements of all parts of the encounter have been performed by me. I agree with the assessment, plan and orders as documented by the fellow/resident, after I modified exam findings and plan of treatments, and the final version is my approved version of the assessment. Additional Comments: Fall, mechanical. No syncope. Cardiology consulted for HS trop elevation likely due to CKD. However patient is known to have mild CAD in 1927 and it can certainly progress. I spend significant amount of time discussing options of medical management alone, stress testing with next step of cardiac cath if indicated. I than discussed risk and benefits of cardiac cath. Patient told me that he does not want to proceed with any invasive testing and therefore will not proceed with stress test. Contiue ASA. DC IV heparin. OP TTE. PT/OT.  Call cardiology with
sounds,no wheeze/crackles  Cardiovascular: S1 S2 normal,no gallop or organic murmur. No carotid bruit  Abdomen:Non tender/non distended. Bowel sounds present  Extremities: No Cyanosis or Clubbing, presents lower extremity edema  Neurological:Alert and oriented. No abnormalities of mood, affect, memory, mentation, or behavior are noted    INVESTIGATIONS      CBC:   Recent Labs     06/01/23  1207 06/01/23  1304 06/02/23  0556   WBC DISREGARD RESULT. SPECIMEN CLOTTED. 9.4 8.6   RBC DISREGARD RESULT. SPECIMEN CLOTTED. 4.21 4.52   HGB DISREGARD RESULT. SPECIMEN CLOTTED. 12.4* 13.0   HCT DISREGARD RESULT. SPECIMEN CLOTTED. 38.0* 41.4   MCV DISREGARD RESULT. SPECIMEN CLOTTED. 90.3 91.6   MCH DISREGARD RESULT. SPECIMEN CLOTTED. 29.5 28.8   MCHC DISREGARD RESULT. SPECIMEN CLOTTED. 32.6 31.4   RDW DISREGARD RESULT. SPECIMEN CLOTTED. 13.6 13.6   PLT DISREGARD RESULT. SPECIMEN CLOTTED. See Reflexed IPF Result See Reflexed IPF Result   MPV DISREGARD RESULT. SPECIMEN CLOTTED.  --   --       BMP:   Recent Labs     06/01/23  1207 06/02/23  0556    138   K 4.3 4.1    102   CO2 18* 21   BUN 22 26*   CREATININE 1.32* 1.50*   GLUCOSE 198* 172*   CALCIUM  --  8.7        Phosphorus:  No results for input(s): PHOS in the last 72 hours. Magnesium:   Recent Labs     06/02/23  0556   MG 2.0     Albumin:   Recent Labs     06/01/23  1207   LABALBU 3.8       Radiology:  Reviewed as available. Thank you for the consultation. Please do not hesitate to call with questions. This note is created with the assistance of a speech-recognition program. While intending to generate a document that actually reflects the content of the visit, no guarantees can be provided that every mistake has been identified and corrected by editing.     Ana Cristina Acosta MD MD, [facility], MD, [facility] CenterP Martin Rowe, FACP   6/2/2023 3:15 PM    [facility]

## 2023-06-02 NOTE — PLAN OF CARE
Problem: Discharge Planning  Goal: Discharge to home or other facility with appropriate resources  6/2/2023 6492 by Howie Cifuentes RN  Outcome: Progressing     Problem: Pain  Goal: Verbalizes/displays adequate comfort level or baseline comfort level  6/2/2023 0632 by Howie Cifuentes RN  Outcome: Progressing     Problem: Safety - Adult  Goal: Free from fall injury  6/2/2023 3138 by Howie Cifuentes RN  Outcome: Progressing     Problem: Skin/Tissue Integrity  Goal: Absence of new skin breakdown  Description: 1. Monitor for areas of redness and/or skin breakdown  2. Assess vascular access sites hourly  3. Every 4-6 hours minimum:  Change oxygen saturation probe site  4. Every 4-6 hours:  If on nasal continuous positive airway pressure, respiratory therapy assess nares and determine need for appliance change or resting period.   6/2/2023 4431 by Howie Cifuentes RN  Outcome: Progressing     Problem: ABCDS Injury Assessment  Goal: Absence of physical injury  6/2/2023 5005 by Howie Cifuentes RN  Outcome: Progressing

## 2023-06-02 NOTE — PLAN OF CARE
Patient in no apparent distress at this time. No new falls or new injuries noted. Patient requiring supplemental O2 to maintain oxygen saturation despite not on home O2. Patient is without complaint of SOB. RT eval ordered. Will continue to monitor.

## 2023-06-02 NOTE — CARE COORDINATION
Met with pt to complete an SBIRT. Pt states that he was unable to get out of his chair at home. He states that he does not drink or use drugs. He denies depression. SBIRT is negative. Alcohol Screening and Brief Intervention        Recent Labs     06/01/23  1207   ALC <10       Alcohol Pre-screening  (MEN ONLY) How many times in the past year have you had 5 or more drinks in a day?: None          Drug Pre-Screening   How many times in the past year have you used a recreational drug or used a prescription medication for nonmedical reasons?: None    Drug Screening DAST       Mood Pre-Screening (PHQ-2)  During the past two weeks, have you been bothered by little interest or pleasure in doing things?: No  During the past two weeks, have you been bothered by feeling down, depressed, or hopeless?: No    Mood Pre-Screening (PHQ-9)         I have interviewed David Garcia, [phone] regarding  His alcohol consumption/drug use and risk for excessive use. Screenings were negative. Patient  N/A intervention at this time.    Deferred []    Completed on: 6/2/2023   CINTHIA Gong

## 2023-06-03 VITALS
WEIGHT: 210 LBS | HEART RATE: 61 BPM | BODY MASS INDEX: 31.83 KG/M2 | SYSTOLIC BLOOD PRESSURE: 148 MMHG | HEART RATE: 61 BPM | OXYGEN SATURATION: 95 % | RESPIRATION RATE: 20 BRPM | HEIGHT: 68 IN | TEMPERATURE: 97.3 F | DIASTOLIC BLOOD PRESSURE: 75 MMHG | BODY MASS INDEX: 31.83 KG/M2 | SYSTOLIC BLOOD PRESSURE: 148 MMHG | DIASTOLIC BLOOD PRESSURE: 75 MMHG | WEIGHT: 210 LBS | RESPIRATION RATE: 20 BRPM | TEMPERATURE: 97.3 F | HEIGHT: 68 IN | OXYGEN SATURATION: 95 %

## 2023-06-03 LAB
AMPHET UR QL SCN: NEGATIVE
AMPHET UR QL SCN: NEGATIVE
ANION GAP SERPL CALCULATED.3IONS-SCNC: 14 MMOL/L (ref 9–17)
ANION GAP SERPL CALCULATED.3IONS-SCNC: 14 MMOL/L (ref 9–17)
BARBITURATES UR QL SCN: NEGATIVE
BARBITURATES UR QL SCN: NEGATIVE
BASOPHILS # BLD: 0.05 K/UL (ref 0–0.2)
BASOPHILS # BLD: 0.05 K/UL (ref 0–0.2)
BASOPHILS NFR BLD: 1 % (ref 0–2)
BASOPHILS NFR BLD: 1 % (ref 0–2)
BENZODIAZ UR QL: NEGATIVE
BENZODIAZ UR QL: NEGATIVE
BUN SERPL-MCNC: 27 MG/DL (ref 8–23)
BUN SERPL-MCNC: 27 MG/DL (ref 8–23)
CALCIUM SERPL-MCNC: 8.7 MG/DL (ref 8.6–10.4)
CALCIUM SERPL-MCNC: 8.7 MG/DL (ref 8.6–10.4)
CANNABINOID SCREEN URINE: NEGATIVE
CANNABINOID SCREEN URINE: NEGATIVE
CHLORIDE SERPL-SCNC: 100 MMOL/L (ref 98–107)
CHLORIDE SERPL-SCNC: 100 MMOL/L (ref 98–107)
CO2 SERPL-SCNC: 20 MMOL/L (ref 20–31)
CO2 SERPL-SCNC: 20 MMOL/L (ref 20–31)
COCAINE UR QL SCN: NEGATIVE
COCAINE UR QL SCN: NEGATIVE
CREAT SERPL-MCNC: 1.31 MG/DL (ref 0.7–1.2)
CREAT SERPL-MCNC: 1.31 MG/DL (ref 0.7–1.2)
CREAT UR-MCNC: 73.3 MG/DL (ref 39–259)
CREAT UR-MCNC: 73.3 MG/DL (ref 39–259)
EOSINOPHIL # BLD: 0.11 K/UL (ref 0–0.44)
EOSINOPHIL # BLD: 0.11 K/UL (ref 0–0.44)
EOSINOPHILS RELATIVE PERCENT: 2 % (ref 1–4)
EOSINOPHILS RELATIVE PERCENT: 2 % (ref 1–4)
ERYTHROCYTE [DISTWIDTH] IN BLOOD BY AUTOMATED COUNT: 13.5 % (ref 11.8–14.4)
ERYTHROCYTE [DISTWIDTH] IN BLOOD BY AUTOMATED COUNT: 13.5 % (ref 11.8–14.4)
FENTANYL URINE: POSITIVE
FENTANYL URINE: POSITIVE
GFR SERPL CREATININE-BSD FRML MDRD: 54 ML/MIN/1.73M2
GFR SERPL CREATININE-BSD FRML MDRD: 54 ML/MIN/1.73M2
GLUCOSE BLD-MCNC: 216 MG/DL (ref 75–110)
GLUCOSE BLD-MCNC: 216 MG/DL (ref 75–110)
GLUCOSE BLD-MCNC: 231 MG/DL (ref 75–110)
GLUCOSE BLD-MCNC: 231 MG/DL (ref 75–110)
GLUCOSE SERPL-MCNC: 218 MG/DL (ref 70–99)
GLUCOSE SERPL-MCNC: 218 MG/DL (ref 70–99)
HCT VFR BLD AUTO: 42.2 % (ref 40.7–50.3)
HCT VFR BLD AUTO: 42.2 % (ref 40.7–50.3)
HGB BLD-MCNC: 13.5 G/DL (ref 13–17)
HGB BLD-MCNC: 13.5 G/DL (ref 13–17)
IMM GRANULOCYTES # BLD AUTO: <0.03 K/UL (ref 0–0.3)
IMM GRANULOCYTES # BLD AUTO: <0.03 K/UL (ref 0–0.3)
IMM GRANULOCYTES NFR BLD: 0 %
IMM GRANULOCYTES NFR BLD: 0 %
LYMPHOCYTES # BLD: 11 % (ref 24–43)
LYMPHOCYTES # BLD: 11 % (ref 24–43)
LYMPHOCYTES NFR BLD: 0.84 K/UL (ref 1.1–3.7)
LYMPHOCYTES NFR BLD: 0.84 K/UL (ref 1.1–3.7)
MCH RBC QN AUTO: 29.2 PG (ref 25.2–33.5)
MCH RBC QN AUTO: 29.2 PG (ref 25.2–33.5)
MCHC RBC AUTO-ENTMCNC: 32 G/DL (ref 28.4–34.8)
MCHC RBC AUTO-ENTMCNC: 32 G/DL (ref 28.4–34.8)
MCV RBC AUTO: 91.3 FL (ref 82.6–102.9)
MCV RBC AUTO: 91.3 FL (ref 82.6–102.9)
METHADONE SCREEN, URINE: NEGATIVE
METHADONE SCREEN, URINE: NEGATIVE
MONOCYTES NFR BLD: 1.11 K/UL (ref 0.1–1.2)
MONOCYTES NFR BLD: 1.11 K/UL (ref 0.1–1.2)
MONOCYTES NFR BLD: 15 % (ref 3–12)
MONOCYTES NFR BLD: 15 % (ref 3–12)
NEUTROPHILS NFR BLD: 71 % (ref 36–65)
NEUTROPHILS NFR BLD: 71 % (ref 36–65)
NEUTS SEG NFR BLD: 5.25 K/UL (ref 1.5–8.1)
NEUTS SEG NFR BLD: 5.25 K/UL (ref 1.5–8.1)
NRBC AUTOMATED: 0 PER 100 WBC
NRBC AUTOMATED: 0 PER 100 WBC
OPIATES UR QL SCN: NEGATIVE
OPIATES UR QL SCN: NEGATIVE
OXYCODONE SCREEN URINE: POSITIVE
OXYCODONE SCREEN URINE: POSITIVE
PCP UR QL SCN: NEGATIVE
PCP UR QL SCN: NEGATIVE
PLATELET # BLD AUTO: ABNORMAL K/UL (ref 138–453)
PLATELET # BLD AUTO: ABNORMAL K/UL (ref 138–453)
PLATELET, FLUORESCENCE: 126 K/UL (ref 138–453)
PLATELET, FLUORESCENCE: 126 K/UL (ref 138–453)
PLATELETS.RETICULATED NFR BLD AUTO: 6.8 % (ref 1.1–10.3)
PLATELETS.RETICULATED NFR BLD AUTO: 6.8 % (ref 1.1–10.3)
POTASSIUM SERPL-SCNC: 4.8 MMOL/L (ref 3.7–5.3)
POTASSIUM SERPL-SCNC: 4.8 MMOL/L (ref 3.7–5.3)
RBC # BLD AUTO: 4.62 M/UL (ref 4.21–5.77)
RBC # BLD AUTO: 4.62 M/UL (ref 4.21–5.77)
SODIUM SERPL-SCNC: 134 MMOL/L (ref 135–144)
SODIUM SERPL-SCNC: 134 MMOL/L (ref 135–144)
TEST INFORMATION: ABNORMAL
TEST INFORMATION: ABNORMAL
TOTAL PROTEIN, URINE: 66 MG/DL
TOTAL PROTEIN, URINE: 66 MG/DL
WBC OTHER # BLD: 7.4 K/UL (ref 3.5–11.3)
WBC OTHER # BLD: 7.4 K/UL (ref 3.5–11.3)

## 2023-06-03 PROCEDURE — 99232 SBSQ HOSP IP/OBS MODERATE 35: CPT | Performed by: INTERNAL MEDICINE

## 2023-06-03 PROCEDURE — 85055 RETICULATED PLATELET ASSAY: CPT

## 2023-06-03 PROCEDURE — 82947 ASSAY GLUCOSE BLOOD QUANT: CPT

## 2023-06-03 PROCEDURE — 36415 COLL VENOUS BLD VENIPUNCTURE: CPT

## 2023-06-03 PROCEDURE — 85027 COMPLETE CBC AUTOMATED: CPT

## 2023-06-03 PROCEDURE — 97161 PT EVAL LOW COMPLEX 20 MIN: CPT

## 2023-06-03 PROCEDURE — 99239 HOSP IP/OBS DSCHRG MGMT >30: CPT | Performed by: INTERNAL MEDICINE

## 2023-06-03 PROCEDURE — 97116 GAIT TRAINING THERAPY: CPT

## 2023-06-03 PROCEDURE — 97530 THERAPEUTIC ACTIVITIES: CPT

## 2023-06-03 PROCEDURE — 2580000003 HC RX 258: Performed by: STUDENT IN AN ORGANIZED HEALTH CARE EDUCATION/TRAINING PROGRAM

## 2023-06-03 PROCEDURE — 6370000000 HC RX 637 (ALT 250 FOR IP): Performed by: STUDENT IN AN ORGANIZED HEALTH CARE EDUCATION/TRAINING PROGRAM

## 2023-06-03 PROCEDURE — 80048 BASIC METABOLIC PNL TOTAL CA: CPT

## 2023-06-03 RX ORDER — ASPIRIN 81 MG/1
81 TABLET, CHEWABLE ORAL DAILY
Qty: 30 TABLET | Refills: 3 | Status: SHIPPED | OUTPATIENT
Start: 2023-06-04

## 2023-06-03 RX ORDER — ATORVASTATIN CALCIUM 40 MG/1
40 TABLET, FILM COATED ORAL NIGHTLY
Qty: 30 TABLET | Refills: 3 | Status: SHIPPED | OUTPATIENT
Start: 2023-06-03

## 2023-06-03 RX ORDER — ALLOPURINOL 100 MG/1
100 TABLET ORAL DAILY
Qty: 30 TABLET | Refills: 3 | Status: SHIPPED | OUTPATIENT
Start: 2023-06-04

## 2023-06-03 RX ADMIN — ALLOPURINOL 100 MG: 100 TABLET ORAL at 08:37

## 2023-06-03 RX ADMIN — INSULIN LISPRO 2 UNITS: 100 INJECTION, SOLUTION INTRAVENOUS; SUBCUTANEOUS at 08:37

## 2023-06-03 RX ADMIN — ASPIRIN 81 MG: 81 TABLET, CHEWABLE ORAL at 08:37

## 2023-06-03 RX ADMIN — SODIUM CHLORIDE, PRESERVATIVE FREE 10 ML: 5 INJECTION INTRAVENOUS at 08:37

## 2023-06-03 RX ADMIN — INSULIN LISPRO 2 UNITS: 100 INJECTION, SOLUTION INTRAVENOUS; SUBCUTANEOUS at 12:32

## 2023-06-03 NOTE — PROGRESS NOTES
[address] [facility] [address]     Emergency/Trauma Note    PATIENT NAME: Olivia Riojas    Shift date: 06/01/2023  Shift day: Thursday   Shift # 1    Room # 24/24     Name: Olivia Riojas          Age: 81 y.o. Gender: male          Temple:   Place of Congregational:     Trauma/Incident type: Adult Trauma Priority  Admit Date & Time: 6/1/2023 11:51 AM  TRAUMA NAME: Trauma Aiyana    ADVANCE DIRECTIVES IN CHART? No    NAME OF DECISION MAKER: Unknown    RELATIONSHIP OF DECISION MAKER TO PATIENT:     PATIENT/EVENT DESCRIPTION:  Trauma Aiyana is a 81 y.o. male who arrived via ground ambulance as adult trauma priority. Patient was conscious and responsive. Patient said he took a fall. Patient to be admitted to 24/24. SPIRITUAL ASSESSMENT-INTERVENTION-OUTCOME:  Patient was receptive to pastoral presence and engaging in conversation. However, patient declined prayer. Patient complained of pain all over his body. Family was not present at the time. Per EMS, patient's daughter, Carlos Mariano ([phone]) would be coming to see patient.  provided ministry of presence and offered emotional support. Patient expressed appreciation for the support he received. PATIENT BELONGINGS:  Patient's belongings were bagged by nurse and placed in patient's room. ANY BELONGINGS OF SIGNIFICANT VALUE NOTED:  Unknown    REGISTRATION STAFF NOTIFIED? Yes    WHAT IS YOUR SPIRITUAL CARE PLAN FOR THIS PATIENT?:  Follow up visits recommended for ongoing assessment of patient's condition and for more emotional support. Electronically signed by Fr. Jaxon Wilson on 6/1/2023 at 12:42 PM.  [facility]  [phone]
Cardiology notified of troponin value 54, via secure messaging. No new orders received.
[facility]  Office: [address], DO, Anish Gotti, DO, Jose Rafael Jourdan, DO, Marianne Chen Blood, DO, Jaci Higuera MD, Aram Green MD, Aaron Hanks MD, Rob Rico MD,  Meera Huddleston MD, Leticia Lanes, MD, Katiuska Pablo, DO, Matt Arredondo MD,  Lakesha Sidhu MD, Juan Smith MD, oDminic Borrego DO, Laxmi Alcazar MD, Trung Castillo MD, Amarilys Swann, DO, Wilson Clark MD, Jair Barajas MD, Monica Christianson MD, Eufemia Leal MD,  Delisa Kumar, DO, Sabina Lakhani MD,  Ashley Babin, CNP,  Coral James, CNP, Mariel Heck, CNP, Yola Suarez, CNP,  Hardik Owens, [facility], Sixto Rincon, CNP, Makeda Nicole, CNP, Roro Mosley, CNP, Rachel Mckeon, CNP, Rimma Estrella, CNP, Karin He, PATheodoreC, Reshma Perez, CNS, Ansley Settler, CNP, Clotann-marie Ware, CNP         [address]    Progress Note    6/2/2023    11:13 AM    Name:   Juliet Morales  MRN:     [id number]     Acct:      [de-identified]   Room:   [facility]2860-34   Day:  1  Admit Date:  6/1/2023 11:51 AM    PCP:   No primary care provider on file. Code Status:  DNR-CCA    Subjective:     C/C:   Chief Complaint   Patient presents with    Fall     Interval History Status: not changed. Patient seen and examined, no issues, discussed plan for thyroid eval and cardiology evaluation   He suspected it was viral in nature. Brief History:     Juliet Morales is a 81 y.o. Non- / non  male who presents with Fall   and is admitted to the hospital for the management of NSTEMI (non-ST elevated myocardial infarction) ([facility] [address] 79-year-old male past medical history of diabetes type 2, hypertension, hyperlipidemia, history of obesity, complete heart block with pacemaker most recently replaced in March 2023 presents with fatigue weakness cough and shortness of breath that have been persisting for the past 3 days.   Patient stated that he was
Home Oxygen Evaluation    Home Oxygen Evaluation completed. Patient is on 0 liters per minute. Resting SpO2 = 95%  Resting SpO2 on room air = 95%    SpO2 on room air with exercise = 97%  SpO2 on oxygen as above with exercise = 97%    Nocturnal Oximetry with patient on room air is recommended is SpO2 is between 89% and 95% (requires additional order).     Rayray Hummel RCP  1:06 PM
Pt arrived to 4B room 417. Pt orientated  to room. Vitals taken. See flowsheet for further charting.       Annalee CASTELLANOS
Pt. With spo2 88% on 4 liters of O2/ NC. O2 increased to 5 liters per nasal cannula. Pt.c/o burning during urination, pt. States \"this just started within the last day\". Pt. With BLE edema +2. NP on call notified of assessment findings via secure messaging. Writer awaiting response.
Pt.'s SPO2 85% on simple mask at 15 liters. Respiratory called,states will  be up to evaluate pt. Pt. Placed on NRB mask at 15liters spo2 93% resp 19. Troponins resulted at 47, cardiology notified via secure messaging.
RT up to see to pt. Spo2 sensor move to ear at this time. Pt. Spo2 sat 98% on o2 2 liters per nasal cannula. Still awaiting response from cardiology, will continue to monitor pt.
Trauma Tertiary Survey    Admit Date: 6/1/2023  Hospital day 0    [facility]     No past medical history on file. Scheduled Meds:   heparin (porcine)  4,000 Units IntraVENous Once     Continuous Infusions:   heparin (PORCINE) Infusion       PRN Meds:heparin (porcine), heparin (porcine)    Subjective:     Patient none. .  Pain is mild, worsens with movement, and some relief by rest.  There  is no  associated numbness, tingling, weakness. Objective:   Patient Vitals for the past 8 hrs:   BP Temp Temp src Pulse Resp SpO2 Height Weight   06/01/23 1500 (!) 156/64 -- -- 82 13 97 % -- --   06/01/23 1445 -- -- -- -- -- -- 5' 8\" (1.727 m) 210 lb (95.3 kg)   06/01/23 1430 (!) 143/61 -- -- 69 18 95 % -- --   06/01/23 1400 (!) 143/102 -- -- 74 15 94 % -- --   06/01/23 1300 (!) 140/56 -- -- 76 18 92 % -- --   06/01/23 1220 (!) 126/49 -- -- 78 20 93 % -- --   06/01/23 1215 (!) 136/55 -- -- 88 20 91 % -- --   06/01/23 1210 (!) 140/56 99.3 °F (37.4 °C) Oral 78 20 91 % -- --   06/01/23 1203 (!) 147/58 -- -- -- -- -- -- --   06/01/23 1200 -- 99.2 °F (37.3 °C) -- 86 24 92 % -- --       No intake/output data recorded. No intake/output data recorded. Radiology:No acute injuries. Incidental finding of thyroid nodules. PHYSICAL EXAM:   GCS:  4 - Opens eyes on own   6 - Follows simple motor commands  5 - Alert and oriented    Pupil size:  Left 2 mm Right 2 mm  Pupil reaction: Yes  Wiggles fingers: Left Yes Right Yes  Hand grasp:   Left normal   Right normal  Wiggles toes: Left Yes    Right Yes  Plantar flexion: Left normal  Right normal    BP (!) 156/64   Pulse 82   Temp 99.3 °F (37.4 °C) (Oral)   Resp 13   Ht 5' 8\" (1.727 m)   Wt 210 lb (95.3 kg)   SpO2 97%   BMI 31.93 kg/m²   General appearance: alert, appears stated age, and cooperative  Head: Normocephalic, without obvious abnormality, atraumatic  Eyes: conjunctivae/corneas clear. PERRL, EOM's intact. Fundi benign.   Ears:  external ear canals normal, no
Writer called to bedside to evaluate patient for desaturation episodes. Pulse ox moved to left ear. Pt placed back on 2L NC with an SpO2 of 100%. Will continue to monitor.
Albumin:   Recent Labs     06/01/23  1207   LABALBU 3.8       Radiology:  Reviewed as available. Please do not hesitate to call with questions. DAIJA Silva   6/3/2023 12:42 PM    [facility]     Attending Physician Statement  I have discussed the care of Keren Cortez, including pertinent history and exam findings,  with the CNP. I have reviewed the key elements of all parts of the encounter with the CNP. I agree with the assessment, plan and orders as documented.     Deep Morales MD MD, MRCP Catherine Elias, FACP   6/3/2023 12:58 PM    Nephrology [address] [place] [address]
CMS 0-100% Score: 38.32 (06/02/23 1153)  ADL Inpatient CMS G-Code Modifier : CJ (06/02/23 1153)           Goals  Short Term Goals  Time Frame for Short Term Goals: pt will, by discharge  Short Term Goal 1: complete LB ADLs and toileting tasks with SBA and set up  Short Term Goal 2: complete UB ADLs with mod I  Short Term Goal 3: dem SBA during functional transfers/functional mobility with LRD, as needed  Short Term Goal 4: dem ~8 minutes dynamic standing tolerance with SBA in order to complete functional tasks  Short Term Goal 5: increase activity tolerance to 25+ minutes in order to participate in daily tasks       Therapy Time   Individual Concurrent Group Co-treatment   Time In 1108         Time Out 1141         Minutes 33         Timed Code Treatment Minutes: [address] [facility], OTR/L
Comments: Educated that he needs to use a walker for the next few days or weeks or until he is back to his baseline level of strength. Educated that he needs to have a family member mow his grass until he is back to baseline.   Education Method: Demonstration;Verbal  Education Outcome: Verbalized understanding;Demonstrated understanding      Therapy Time   Individual Concurrent Group Co-treatment   Time In 2399         Time Out 1027         Minutes 32         Timed Code Treatment Minutes: Marcio 1794, PT

## 2023-06-03 NOTE — DISCHARGE INSTR - COC
Risk of Unplanned Readmission:  12           Discharging to Facility/ Agency   Name:   Address:  Phone:  Fax:    Dialysis Facility (if applicable)   Name:  Address:  Dialysis Schedule:  Phone:  Fax:    / signature: {Esignature:[id number]}    PHYSICIAN SECTION    Prognosis: Good    Condition at Discharge: Stable    Rehab Potential (if transferring to Rehab): Good    Recommended Labs or Other Treatments After Discharge:   See pcp outpatient. Physician Certification: I certify the above information and transfer of Olivia Riojas  is necessary for the continuing treatment of the diagnosis listed and that he requires [address] for greater 30 days.      Update Admission H&P: No change in H&P    PHYSICIAN SIGNATURE:  Electronically signed by Lilia Maier DO on 6/3/23 at 1:21 PM EDT

## 2023-06-03 NOTE — DISCHARGE SUMMARY
Additional Contrast? None    Result Date: 6/1/2023  1. No acute traumatic injury involving the chest, abdomen, and pelvis. 2. No evidence of an acute fracture in the thoracic and lumbar spine. 3. 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 Outpatient thyroid ultrasound is recommended for further evaluation. 4. 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 CT LUMBAR SPINE TRAUMA RECONSTRUCTION    Result Date: 6/1/2023  1. No acute traumatic injury involving the chest, abdomen, and pelvis. 2. No evidence of an acute fracture in the thoracic and lumbar spine. 3. 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 Outpatient thyroid ultrasound is recommended for further evaluation. 4. 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 CT THORACIC SPINE TRAUMA RECONSTRUCTION    Result Date: 6/1/2023  1. No acute traumatic injury involving the chest, abdomen, and pelvis. 2. No evidence of an acute fracture in the thoracic and lumbar spine. 3. 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 Outpatient thyroid ultrasound is recommended for further evaluation. 4. 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 Consultations:    Consults:     Final Specialist Recommendations/Findings:   IP CONSULT TO CARDIOLOGY  IP CONSULT TO HOSPITALIST  IP CONSULT TO SOCIAL WORK  IP CONSULT TO NEPHROLOGY      The patient was seen and examined on day of discharge and this discharge summary is in conjunction with any daily progress note from day of discharge. Discharge plan:     Disposition: {DISPOSITIONS:[id number]}    Physician Follow Up:   ***  No follow-up provider specified.      Requiring Further Evaluation/Follow Up POST HOSPITALIZATION/Incidental Findings: ***    Diet: {diet:46934}    Activity: As tolerated***    Instructions to Patient: ***    Discharge Medications:      Medication

## 2023-06-04 LAB
MICROORGANISM SPEC CULT: NORMAL
MICROORGANISM SPEC CULT: NORMAL
SERVICE CMNT-IMP: NORMAL
SERVICE CMNT-IMP: NORMAL
SPECIMEN DESCRIPTION: NORMAL
SPECIMEN DESCRIPTION: NORMAL
